# Patient Record
Sex: FEMALE | Race: WHITE | Employment: FULL TIME
[De-identification: names, ages, dates, MRNs, and addresses within clinical notes are randomized per-mention and may not be internally consistent; named-entity substitution may affect disease eponyms.]

---

## 2017-03-07 RX ORDER — TRANEXAMIC ACID 650 1/1
650 TABLET ORAL 3 TIMES DAILY
COMMUNITY
End: 2017-10-25 | Stop reason: ALTCHOICE

## 2017-03-07 RX ORDER — CALCIUM CARBONATE/VITAMIN D3 600 MG-10
1 TABLET ORAL DAILY
COMMUNITY

## 2017-03-07 RX ORDER — MULTIVIT-MIN/IRON FUM/FOLIC AC 7.5 MG-4
1 TABLET ORAL DAILY
COMMUNITY

## 2017-03-09 ENCOUNTER — SURGERY (OUTPATIENT)
Age: 52
End: 2017-03-09

## 2017-03-09 ENCOUNTER — HOSPITAL ENCOUNTER (OUTPATIENT)
Facility: HOSPITAL | Age: 52
Setting detail: HOSPITAL OUTPATIENT SURGERY
Discharge: HOME | End: 2017-03-09
Attending: OBSTETRICS & GYNECOLOGY | Admitting: OBSTETRICS & GYNECOLOGY
Payer: COMMERCIAL

## 2017-03-09 ENCOUNTER — ANESTHESIA (OUTPATIENT)
Dept: SURGERY | Facility: HOSPITAL | Age: 52
End: 2017-03-09
Payer: COMMERCIAL

## 2017-03-09 ENCOUNTER — ANESTHESIA EVENT (OUTPATIENT)
Dept: SURGERY | Facility: HOSPITAL | Age: 52
End: 2017-03-09
Payer: COMMERCIAL

## 2017-03-09 VITALS
TEMPERATURE: 98 F | HEIGHT: 71 IN | DIASTOLIC BLOOD PRESSURE: 71 MMHG | HEART RATE: 78 BPM | BODY MASS INDEX: 30.65 KG/M2 | RESPIRATION RATE: 18 BRPM | SYSTOLIC BLOOD PRESSURE: 123 MMHG | WEIGHT: 218.94 LBS | OXYGEN SATURATION: 98 %

## 2017-03-09 LAB
POCT LOT NUMBER: NORMAL
POCT URINE PREGNANCY: NEGATIVE

## 2017-03-09 PROCEDURE — 81025 URINE PREGNANCY TEST: CPT | Performed by: OBSTETRICS & GYNECOLOGY

## 2017-03-09 PROCEDURE — 88305 TISSUE EXAM BY PATHOLOGIST: CPT | Performed by: OBSTETRICS & GYNECOLOGY

## 2017-03-09 PROCEDURE — 0UBC8ZZ EXCISION OF CERVIX, VIA NATURAL OR ARTIFICIAL OPENING ENDOSCOPIC: ICD-10-PCS | Performed by: OBSTETRICS & GYNECOLOGY

## 2017-03-09 PROCEDURE — 0UDB8ZZ EXTRACTION OF ENDOMETRIUM, VIA NATURAL OR ARTIFICIAL OPENING ENDOSCOPIC: ICD-10-PCS | Performed by: OBSTETRICS & GYNECOLOGY

## 2017-03-09 RX ORDER — MEPERIDINE HYDROCHLORIDE 25 MG/ML
12.5 INJECTION INTRAMUSCULAR; INTRAVENOUS; SUBCUTANEOUS AS NEEDED
Status: DISCONTINUED | OUTPATIENT
Start: 2017-03-09 | End: 2017-03-09

## 2017-03-09 RX ORDER — SODIUM CHLORIDE, SODIUM LACTATE, POTASSIUM CHLORIDE, CALCIUM CHLORIDE 600; 310; 30; 20 MG/100ML; MG/100ML; MG/100ML; MG/100ML
INJECTION, SOLUTION INTRAVENOUS CONTINUOUS
Status: DISCONTINUED | OUTPATIENT
Start: 2017-03-09 | End: 2017-03-09

## 2017-03-09 RX ORDER — MIDAZOLAM HYDROCHLORIDE 1 MG/ML
1 INJECTION INTRAMUSCULAR; INTRAVENOUS EVERY 5 MIN PRN
Status: DISCONTINUED | OUTPATIENT
Start: 2017-03-09 | End: 2017-03-09

## 2017-03-09 RX ORDER — HYDROCODONE BITARTRATE AND ACETAMINOPHEN 5; 325 MG/1; MG/1
2 TABLET ORAL AS NEEDED
Status: DISCONTINUED | OUTPATIENT
Start: 2017-03-09 | End: 2017-03-09

## 2017-03-09 RX ORDER — HYDROCODONE BITARTRATE AND ACETAMINOPHEN 5; 325 MG/1; MG/1
1 TABLET ORAL AS NEEDED
Status: DISCONTINUED | OUTPATIENT
Start: 2017-03-09 | End: 2017-03-09

## 2017-03-09 RX ORDER — ONDANSETRON 2 MG/ML
4 INJECTION INTRAMUSCULAR; INTRAVENOUS AS NEEDED
Status: DISCONTINUED | OUTPATIENT
Start: 2017-03-09 | End: 2017-03-09

## 2017-03-09 RX ORDER — METOCLOPRAMIDE HYDROCHLORIDE 5 MG/ML
10 INJECTION INTRAMUSCULAR; INTRAVENOUS AS NEEDED
Status: DISCONTINUED | OUTPATIENT
Start: 2017-03-09 | End: 2017-03-09

## 2017-03-09 RX ORDER — HYDROMORPHONE HYDROCHLORIDE 1 MG/ML
0.4 INJECTION, SOLUTION INTRAMUSCULAR; INTRAVENOUS; SUBCUTANEOUS EVERY 5 MIN PRN
Status: DISCONTINUED | OUTPATIENT
Start: 2017-03-09 | End: 2017-03-09

## 2017-03-09 RX ORDER — ACETAMINOPHEN 500 MG
1000 TABLET ORAL ONCE AS NEEDED
Status: DISCONTINUED | OUTPATIENT
Start: 2017-03-09 | End: 2017-03-09

## 2017-03-09 RX ORDER — DIPHENHYDRAMINE HYDROCHLORIDE 50 MG/ML
12.5 INJECTION INTRAMUSCULAR; INTRAVENOUS AS NEEDED
Status: DISCONTINUED | OUTPATIENT
Start: 2017-03-09 | End: 2017-03-09

## 2017-03-09 RX ORDER — NALOXONE HYDROCHLORIDE 0.4 MG/ML
80 INJECTION, SOLUTION INTRAMUSCULAR; INTRAVENOUS; SUBCUTANEOUS AS NEEDED
Status: DISCONTINUED | OUTPATIENT
Start: 2017-03-09 | End: 2017-03-09

## 2017-03-09 NOTE — BRIEF OP NOTE
[facility] SURGERY  Brief Op Note     Precious Barnhart Location: OR   CSN [id number] MRN [id number]   Admission Date 3/9/2017 Operation Date 3/9/2017   Attending Physician Aggie Ho,* Operating Physician Fatemeh Campbell MD       Pre-Ope

## 2017-03-09 NOTE — ANESTHESIA PREPROCEDURE EVALUATION
PRE-OP EVALUATION    Patient Name: Ean Ureña    Pre-op Diagnosis: ABNORMAL UTERINE BLEEDING      Procedure(s): HYSTEROSCOPY, DILATION  AND CURETTAGE      Surgeon(s) and Role:     * Goyo Rees MD - Primary    Pre-op vitals reviewed.   Temp: Cardiovascular    Cardiovascular exam normal.         Dental    No notable dental history. Pulmonary    Pulmonary exam normal.                 Other findings            ASA: 1   Plan: general  NPO status verified and patient meets guidelines.

## 2017-03-09 NOTE — H&P
[facility]    History and Physical    Emilia Owens Patient Status:  Hospital Outpatient Surgery    1965 MRN [id number]   [facility] PRE OP HOLDING Attending Ava Jason Day # 0 PCP Fernando Hernandez MD DiphenhydrAMINE HCl (BENADRYL ALLERGY) 25 MG Oral Cap Take 25 mg by mouth every 6 (six) hours as needed. ADVIL 200 MG OR CAPS AS NEEDED       Review of Systems:     Constitutional: Negative for fever and chills.        Physical Exam:   Vital Signs:  Blo

## 2017-03-09 NOTE — ANESTHESIA POSTPROCEDURE EVALUATION
[address]  Patient Status:  Hospital Outpatient Surgery   Age/Gender 46year old female MRN [id number]   [facility] SURGERY Attending Mariola Days Day # 0 PCP Niall Jean MD       Anesthesia Po

## 2017-03-09 NOTE — OPERATIVE REPORT
Pre-Operative Diagnosis: ABNORMAL UTERINE BLEEDING, HEAVY MENSES     Post-Operative Diagnosis: ABNORMAL UTERINE BLEEDING , HEAVY MENSES     Procedure Performed:   Procedure(s):   HYSTEROSCOPY, DILATION  AND CURETTAGE, CERVICAL POLYPECTOMY     Surgeon(s) and

## 2017-10-26 ENCOUNTER — LAB ENCOUNTER (OUTPATIENT)
Dept: LAB | Facility: HOSPITAL | Age: 52
End: 2017-10-26
Attending: FAMILY MEDICINE
Payer: COMMERCIAL

## 2017-10-26 DIAGNOSIS — Z00.00 ROUTINE GENERAL MEDICAL EXAMINATION AT A HEALTH CARE FACILITY: ICD-10-CM

## 2017-10-26 PROCEDURE — 85025 COMPLETE CBC W/AUTO DIFF WBC: CPT

## 2017-10-26 PROCEDURE — 80053 COMPREHEN METABOLIC PANEL: CPT

## 2017-10-26 PROCEDURE — 80061 LIPID PANEL: CPT

## 2017-10-26 PROCEDURE — 36415 COLL VENOUS BLD VENIPUNCTURE: CPT

## 2017-10-31 ENCOUNTER — HOSPITAL ENCOUNTER (OUTPATIENT)
Dept: ULTRASOUND IMAGING | Facility: HOSPITAL | Age: 52
Discharge: HOME | End: 2017-10-31
Attending: OBSTETRICS & GYNECOLOGY
Payer: COMMERCIAL

## 2017-10-31 ENCOUNTER — HOSPITAL ENCOUNTER (OUTPATIENT)
Dept: MAMMOGRAPHY | Facility: HOSPITAL | Age: 52
Discharge: HOME | End: 2017-10-31
Attending: OBSTETRICS & GYNECOLOGY
Payer: COMMERCIAL

## 2017-10-31 DIAGNOSIS — R92.2 DENSE BREASTS: ICD-10-CM

## 2017-10-31 DIAGNOSIS — Z00.00 ROUTINE PHYSICAL EXAMINATION: ICD-10-CM

## 2017-10-31 PROCEDURE — 77063 BREAST TOMOSYNTHESIS BI: CPT | Performed by: OBSTETRICS & GYNECOLOGY

## 2017-10-31 PROCEDURE — 76641 ULTRASOUND BREAST COMPLETE: CPT | Performed by: OBSTETRICS & GYNECOLOGY

## 2017-10-31 PROCEDURE — 77067 SCR MAMMO BI INCL CAD: CPT | Performed by: OBSTETRICS & GYNECOLOGY

## 2017-11-10 ENCOUNTER — HOSPITAL ENCOUNTER (OUTPATIENT)
Dept: ULTRASOUND IMAGING | Facility: HOSPITAL | Age: 52
Discharge: HOME | End: 2017-11-10
Attending: OBSTETRICS & GYNECOLOGY
Payer: COMMERCIAL

## 2017-11-10 DIAGNOSIS — R92.8 ABNORMAL MAMMOGRAM: ICD-10-CM

## 2017-11-10 PROCEDURE — 76642 ULTRASOUND BREAST LIMITED: CPT | Performed by: OBSTETRICS & GYNECOLOGY

## 2017-11-20 PROBLEM — G47.30 SLEEP-DISORDERED BREATHING: Status: ACTIVE | Noted: 2017-11-20

## 2017-11-20 PROBLEM — R06.83 SNORING: Status: ACTIVE | Noted: 2017-11-20

## 2017-11-22 ENCOUNTER — LAB ENCOUNTER (OUTPATIENT)
Dept: LAB | Facility: HOSPITAL | Age: 52
End: 2017-11-22
Attending: OTOLARYNGOLOGY
Payer: COMMERCIAL

## 2017-11-22 DIAGNOSIS — E04.1 THYROID NODULE: ICD-10-CM

## 2017-11-22 PROCEDURE — 36415 COLL VENOUS BLD VENIPUNCTURE: CPT

## 2017-11-22 PROCEDURE — 84439 ASSAY OF FREE THYROXINE: CPT

## 2017-11-22 PROCEDURE — 84443 ASSAY THYROID STIM HORMONE: CPT

## 2017-11-27 NOTE — PROGRESS NOTES
Please call and tell her that her thyroid function is just borderline low. Can start a low dose synthroid or follow up with PCP to discuss.

## 2017-11-29 NOTE — PROGRESS NOTES
Received voice mail from pt returning my phone call, called pt back was unable to reach pt left message request pt call back

## 2017-11-30 NOTE — PROGRESS NOTES
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 Attempted to contact pt; left detailed message with info per RB: thyroid function is just borderline low.

## 2018-11-20 ENCOUNTER — HOSPITAL ENCOUNTER (OUTPATIENT)
Dept: MAMMOGRAPHY | Facility: HOSPITAL | Age: 53
Discharge: HOME | End: 2018-11-20
Attending: FAMILY MEDICINE
Payer: COMMERCIAL

## 2018-11-20 DIAGNOSIS — Z12.39 SCREENING BREAST EXAMINATION: ICD-10-CM

## 2018-11-20 DIAGNOSIS — Z12.31 VISIT FOR SCREENING MAMMOGRAM: ICD-10-CM

## 2018-11-20 PROCEDURE — 77067 SCR MAMMO BI INCL CAD: CPT | Performed by: FAMILY MEDICINE

## 2018-11-20 PROCEDURE — 77063 BREAST TOMOSYNTHESIS BI: CPT | Performed by: FAMILY MEDICINE

## 2018-12-06 ENCOUNTER — HOSPITAL ENCOUNTER (OUTPATIENT)
Dept: ULTRASOUND IMAGING | Facility: HOSPITAL | Age: 53
Discharge: HOME | End: 2018-12-06
Attending: OBSTETRICS & GYNECOLOGY
Payer: COMMERCIAL

## 2018-12-06 DIAGNOSIS — R92.2 DENSE BREAST TISSUE: ICD-10-CM

## 2018-12-06 PROCEDURE — 76641 ULTRASOUND BREAST COMPLETE: CPT | Performed by: OBSTETRICS & GYNECOLOGY

## 2019-01-04 ENCOUNTER — HOSPITAL ENCOUNTER (OUTPATIENT)
Dept: ULTRASOUND IMAGING | Facility: HOSPITAL | Age: 54
Discharge: HOME | End: 2019-01-04
Attending: OBSTETRICS & GYNECOLOGY
Payer: COMMERCIAL

## 2019-01-04 ENCOUNTER — APPOINTMENT (OUTPATIENT)
Dept: LAB | Facility: HOSPITAL | Age: 54
End: 2019-01-04
Attending: FAMILY MEDICINE
Payer: COMMERCIAL

## 2019-01-04 DIAGNOSIS — R92.8 ABNORMAL MAMMOGRAM: ICD-10-CM

## 2019-01-04 DIAGNOSIS — E03.9 HYPOTHYROIDISM, UNSPECIFIED TYPE: ICD-10-CM

## 2019-01-04 LAB — TSI SER-ACNC: 2.83 MIU/ML (ref 0.35–5.5)

## 2019-01-04 PROCEDURE — 36415 COLL VENOUS BLD VENIPUNCTURE: CPT

## 2019-01-04 PROCEDURE — 76642 ULTRASOUND BREAST LIMITED: CPT | Performed by: OBSTETRICS & GYNECOLOGY

## 2019-01-04 PROCEDURE — 84443 ASSAY THYROID STIM HORMONE: CPT

## 2019-02-04 PROBLEM — M75.02 ADHESIVE CAPSULITIS OF LEFT SHOULDER: Status: ACTIVE | Noted: 2019-02-04

## 2019-02-04 PROBLEM — S43.422A SPRAIN OF LEFT ROTATOR CUFF CAPSULE, INITIAL ENCOUNTER: Status: ACTIVE | Noted: 2019-02-04

## 2019-05-06 PROBLEM — G89.29 CHRONIC LEFT SHOULDER PAIN: Status: ACTIVE | Noted: 2019-05-06

## 2019-05-06 PROBLEM — M25.512 CHRONIC LEFT SHOULDER PAIN: Status: ACTIVE | Noted: 2019-05-06

## 2019-07-26 ENCOUNTER — HOSPITAL ENCOUNTER (OUTPATIENT)
Dept: ULTRASOUND IMAGING | Facility: HOSPITAL | Age: 54
Discharge: HOME | End: 2019-07-26
Attending: OBSTETRICS & GYNECOLOGY
Payer: COMMERCIAL

## 2019-07-26 DIAGNOSIS — R92.8 ABNORMAL ULTRASOUND OF BREAST: ICD-10-CM

## 2019-07-26 PROCEDURE — 76642 ULTRASOUND BREAST LIMITED: CPT | Performed by: OBSTETRICS & GYNECOLOGY

## 2020-01-24 ENCOUNTER — HOSPITAL ENCOUNTER (OUTPATIENT)
Dept: MAMMOGRAPHY | Facility: HOSPITAL | Age: 55
Discharge: HOME | End: 2020-01-24
Attending: OBSTETRICS & GYNECOLOGY
Payer: COMMERCIAL

## 2020-01-24 DIAGNOSIS — Z12.31 VISIT FOR SCREENING MAMMOGRAM: ICD-10-CM

## 2020-01-24 PROCEDURE — 77063 BREAST TOMOSYNTHESIS BI: CPT | Performed by: OBSTETRICS & GYNECOLOGY

## 2020-01-24 PROCEDURE — 77067 SCR MAMMO BI INCL CAD: CPT | Performed by: OBSTETRICS & GYNECOLOGY

## 2020-01-29 ENCOUNTER — HOSPITAL ENCOUNTER (OUTPATIENT)
Dept: MAMMOGRAPHY | Facility: HOSPITAL | Age: 55
Discharge: HOME | End: 2020-01-29
Attending: OBSTETRICS & GYNECOLOGY
Payer: COMMERCIAL

## 2020-01-29 DIAGNOSIS — R92.2 INCONCLUSIVE MAMMOGRAM: ICD-10-CM

## 2020-01-29 PROCEDURE — 77061 BREAST TOMOSYNTHESIS UNI: CPT | Performed by: OBSTETRICS & GYNECOLOGY

## 2020-01-29 PROCEDURE — 77065 DX MAMMO INCL CAD UNI: CPT | Performed by: OBSTETRICS & GYNECOLOGY

## 2020-01-29 PROCEDURE — 76642 ULTRASOUND BREAST LIMITED: CPT | Performed by: OBSTETRICS & GYNECOLOGY

## 2020-02-10 PROBLEM — H93.299 ABNORMAL AUDITORY PERCEPTION, UNSPECIFIED LATERALITY: Status: ACTIVE | Noted: 2020-02-10

## 2020-02-10 PROBLEM — H81.11 BPPV (BENIGN PAROXYSMAL POSITIONAL VERTIGO), RIGHT: Status: ACTIVE | Noted: 2020-02-10

## 2020-04-15 ENCOUNTER — TELEPHONE (OUTPATIENT)
Dept: INTERNAL MEDICINE CLINIC | Facility: HOSPITAL | Age: 55
End: 2020-04-15

## 2020-04-15 DIAGNOSIS — Z20.822 SUSPECTED COVID-19 VIRUS INFECTION: Primary | ICD-10-CM

## 2020-04-16 ENCOUNTER — LAB ENCOUNTER (OUTPATIENT)
Dept: LAB | Facility: HOSPITAL | Age: 55
End: 2020-04-16
Attending: PREVENTIVE MEDICINE
Payer: COMMERCIAL

## 2020-04-16 DIAGNOSIS — Z20.822 SUSPECTED COVID-19 VIRUS INFECTION: ICD-10-CM

## 2020-04-17 NOTE — IMMEDIATE CARE/WORKERS COMP PHYSICIAN REPORT
Called employee (verified by 2 identifiers) to give NEGATIVE results of COVID test that were reviewed by Dr. Jax Nickerson.     Per [facility] negative COVID guidelines:    · Employee instructed to continue to self-monitor symptoms and RTW when fever free for 24

## 2020-04-17 NOTE — PROGRESS NOTES
Rapid and confirmation COVID were negative. COVID hotline emailed to get in touch with patient and give further instructions.

## 2020-06-01 ENCOUNTER — LAB ENCOUNTER (OUTPATIENT)
Dept: LAB | Facility: HOSPITAL | Age: 55
End: 2020-06-01
Attending: INTERNAL MEDICINE
Payer: COMMERCIAL

## 2020-06-01 DIAGNOSIS — Z01.812 PRE-PROCEDURE LAB EXAM: ICD-10-CM

## 2020-06-04 PROBLEM — Z86.010 HISTORY OF ADENOMATOUS POLYP OF COLON: Status: ACTIVE | Noted: 2020-06-04

## 2020-06-04 PROBLEM — Z86.0101 HISTORY OF ADENOMATOUS POLYP OF COLON: Status: ACTIVE | Noted: 2020-06-04

## 2020-07-21 DIAGNOSIS — Z78.9 PARTICIPANT IN HEALTH AND WELLNESS PLAN: Primary | ICD-10-CM

## 2020-07-22 ENCOUNTER — NURSE ONLY (OUTPATIENT)
Dept: LAB | Age: 55
End: 2020-07-22
Attending: PREVENTIVE MEDICINE

## 2020-07-22 DIAGNOSIS — Z78.9 PARTICIPANT IN HEALTH AND WELLNESS PLAN: ICD-10-CM

## 2020-07-22 LAB — SARS-COV-2 IGG SERPLBLD QL IA.RAPID: NEGATIVE

## 2020-07-22 PROCEDURE — 86769 SARS-COV-2 COVID-19 ANTIBODY: CPT

## 2020-09-28 ENCOUNTER — TELEPHONE (OUTPATIENT)
Dept: INTERNAL MEDICINE CLINIC | Facility: CLINIC | Age: 55
End: 2020-09-28

## 2020-09-28 ENCOUNTER — OFFICE VISIT (OUTPATIENT)
Dept: INTERNAL MEDICINE CLINIC | Facility: CLINIC | Age: 55
End: 2020-09-28
Payer: COMMERCIAL

## 2020-09-28 VITALS
HEART RATE: 92 BPM | TEMPERATURE: 97 F | RESPIRATION RATE: 16 BRPM | WEIGHT: 189 LBS | HEIGHT: 71 IN | BODY MASS INDEX: 26.46 KG/M2 | DIASTOLIC BLOOD PRESSURE: 74 MMHG | SYSTOLIC BLOOD PRESSURE: 114 MMHG

## 2020-09-28 DIAGNOSIS — M25.522 LEFT ELBOW PAIN: ICD-10-CM

## 2020-09-28 DIAGNOSIS — M25.512 CHRONIC PAIN OF BOTH SHOULDERS: Primary | ICD-10-CM

## 2020-09-28 DIAGNOSIS — Z13.220 SCREENING FOR LIPID DISORDERS: ICD-10-CM

## 2020-09-28 DIAGNOSIS — E03.9 ACQUIRED HYPOTHYROIDISM: ICD-10-CM

## 2020-09-28 DIAGNOSIS — M25.511 CHRONIC PAIN OF BOTH SHOULDERS: Primary | ICD-10-CM

## 2020-09-28 DIAGNOSIS — Z13.0 SCREENING FOR DISORDER OF BLOOD AND BLOOD-FORMING ORGANS: ICD-10-CM

## 2020-09-28 DIAGNOSIS — Z13.228 SCREENING FOR METABOLIC DISORDER: ICD-10-CM

## 2020-09-28 DIAGNOSIS — Z00.00 ROUTINE GENERAL MEDICAL EXAMINATION AT A HEALTH CARE FACILITY: ICD-10-CM

## 2020-09-28 DIAGNOSIS — E03.9 ACQUIRED HYPOTHYROIDISM: Primary | ICD-10-CM

## 2020-09-28 DIAGNOSIS — H81.11 BPPV (BENIGN PAROXYSMAL POSITIONAL VERTIGO), RIGHT: ICD-10-CM

## 2020-09-28 DIAGNOSIS — G89.29 CHRONIC PAIN OF BOTH SHOULDERS: Primary | ICD-10-CM

## 2020-09-28 PROCEDURE — 3074F SYST BP LT 130 MM HG: CPT | Performed by: FAMILY MEDICINE

## 2020-09-28 PROCEDURE — 3078F DIAST BP <80 MM HG: CPT | Performed by: FAMILY MEDICINE

## 2020-09-28 PROCEDURE — 99203 OFFICE O/P NEW LOW 30 MIN: CPT | Performed by: FAMILY MEDICINE

## 2020-09-28 PROCEDURE — 3008F BODY MASS INDEX DOCD: CPT | Performed by: FAMILY MEDICINE

## 2020-09-28 NOTE — TELEPHONE ENCOUNTER
Annual Physical   Future Appointments   Date Time Provider Kami Karen   11/30/2020  9:40 AM Taco Flores MD [facility]       Lab is Paola Al Pt aware to fast and to complete labs no sooner than 2 weeks prior to physical   No call back required

## 2020-09-28 NOTE — PROGRESS NOTES
Sophie Jo  11/13/1965    Patient presents with:  Wellness Visit: DD rm 9, Establish Care, Wellness Visit  Shoulder Pain: H/O L Shoulder Tear, Elbow Pain  Health Maintenance: February       HPI:   Sophie Jo is a 47year old female who presents with to e times daily as needed for Dizziness. 60 tablet 1   • FLUTICASONE PROPIONATE 50 MCG/ACT Nasal Suspension SPRAY 2 SPRAYS INTO EACH NOSTRIL EVERY DAY 3 Bottle 0   • Psyllium (METAMUCIL FIBER OR) Take by mouth 2 (two) times daily.      • Multiple Vitamins-[place] CURETTAGE N/A 3/9/2017    Performed by Connie Hernandez MD at [facility] MAIN OR      Family History   Problem Relation Age of Onset   • Heart Disorder Father 67        MI x 2, first at 67   • Lipids Father    • Hypertension Father    • Heart Attack Father care.    1. Acquired hypothyroidism  - Last TSH normal, continue current medication dose    2. BPPV (benign paroxysmal positional vertigo), right  - Controlled with PRN meclizine and home vestibular therapy, continue current treatment.      3. Left elbow pa

## 2020-10-01 ENCOUNTER — HOSPITAL ENCOUNTER (OUTPATIENT)
Dept: GENERAL RADIOLOGY | Facility: HOSPITAL | Age: 55
Discharge: HOME | End: 2020-10-01
Attending: FAMILY MEDICINE
Payer: COMMERCIAL

## 2020-10-01 ENCOUNTER — LAB ENCOUNTER (OUTPATIENT)
Dept: LAB | Facility: HOSPITAL | Age: 55
End: 2020-10-01
Attending: FAMILY MEDICINE
Payer: COMMERCIAL

## 2020-10-01 DIAGNOSIS — Z13.0 SCREENING FOR DISORDER OF BLOOD AND BLOOD-FORMING ORGANS: ICD-10-CM

## 2020-10-01 DIAGNOSIS — M25.522 LEFT ELBOW PAIN: ICD-10-CM

## 2020-10-01 DIAGNOSIS — Z13.220 SCREENING FOR LIPID DISORDERS: ICD-10-CM

## 2020-10-01 DIAGNOSIS — Z13.228 SCREENING FOR METABOLIC DISORDER: ICD-10-CM

## 2020-10-01 DIAGNOSIS — Z00.00 ROUTINE GENERAL MEDICAL EXAMINATION AT A HEALTH CARE FACILITY: ICD-10-CM

## 2020-10-01 DIAGNOSIS — E03.9 ACQUIRED HYPOTHYROIDISM: ICD-10-CM

## 2020-10-01 PROCEDURE — 80061 LIPID PANEL: CPT

## 2020-10-01 PROCEDURE — 80053 COMPREHEN METABOLIC PANEL: CPT

## 2020-10-01 PROCEDURE — 73080 X-RAY EXAM OF ELBOW: CPT | Performed by: FAMILY MEDICINE

## 2020-10-01 PROCEDURE — 85025 COMPLETE CBC W/AUTO DIFF WBC: CPT

## 2020-10-01 PROCEDURE — 84443 ASSAY THYROID STIM HORMONE: CPT

## 2020-10-01 PROCEDURE — 36415 COLL VENOUS BLD VENIPUNCTURE: CPT

## 2020-10-23 ENCOUNTER — OFFICE VISIT (OUTPATIENT)
Dept: ORTHOPEDICS CLINIC | Facility: CLINIC | Age: 55
End: 2020-10-23
Payer: COMMERCIAL

## 2020-10-23 VITALS — HEART RATE: 88 BPM | OXYGEN SATURATION: 96 %

## 2020-10-23 DIAGNOSIS — M25.529 ELBOW PAIN, CHRONIC, UNSPECIFIED LATERALITY: Primary | ICD-10-CM

## 2020-10-23 DIAGNOSIS — G89.29 ELBOW PAIN, CHRONIC, UNSPECIFIED LATERALITY: Primary | ICD-10-CM

## 2020-10-23 PROCEDURE — 99213 OFFICE O/P EST LOW 20 MIN: CPT | Performed by: FAMILY MEDICINE

## 2020-10-23 RX ORDER — NAPROXEN 250 MG/1
250 TABLET ORAL 2 TIMES DAILY WITH MEALS
COMMUNITY
End: 2021-01-05 | Stop reason: ALTCHOICE

## 2020-10-23 NOTE — PROGRESS NOTES
[facility] Progress Note    Subjective:  Patient is present for follow-up on her bilateral lateral elbow pain. Since her last visit she has been modifying activities and using oral naproxen for pain. She did complete her x-ray.   She notes that her based on her imaging thus far and exam.  She has no significant weakness or paresthesias to suspect pain entrapment of radial tunnel syndrome. Her tenderness is outside of her extensor mass making peer lateral epicondylitis unlikely.   Her left elbow x-ray

## 2020-11-20 ENCOUNTER — OFFICE VISIT (OUTPATIENT)
Dept: ORTHOPEDICS CLINIC | Facility: CLINIC | Age: 55
End: 2020-11-20
Payer: COMMERCIAL

## 2020-11-20 VITALS — OXYGEN SATURATION: 98 % | HEART RATE: 79 BPM

## 2020-11-20 DIAGNOSIS — M25.529 ELBOW PAIN, CHRONIC, UNSPECIFIED LATERALITY: Primary | ICD-10-CM

## 2020-11-20 DIAGNOSIS — G89.29 ELBOW PAIN, CHRONIC, UNSPECIFIED LATERALITY: Primary | ICD-10-CM

## 2020-11-20 DIAGNOSIS — M77.12 LATERAL EPICONDYLITIS OF BOTH ELBOWS: ICD-10-CM

## 2020-11-20 DIAGNOSIS — M77.11 LATERAL EPICONDYLITIS OF BOTH ELBOWS: ICD-10-CM

## 2020-11-20 PROCEDURE — 99072 ADDL SUPL MATRL&STAF TM PHE: CPT | Performed by: FAMILY MEDICINE

## 2020-11-20 PROCEDURE — 99213 OFFICE O/P EST LOW 20 MIN: CPT | Performed by: FAMILY MEDICINE

## 2020-11-20 NOTE — PROGRESS NOTES
[facility] Progress Note    Subjective:  Patient is present for follow-up on her bilateral lateral elbow pain. Since her last visit she has been modifying activities and using oral naproxen for pain.   She has noted continued but slow improvement in elbows      Bhavik Huizar MD  [facility]

## 2020-11-30 ENCOUNTER — OFFICE VISIT (OUTPATIENT)
Dept: INTERNAL MEDICINE CLINIC | Facility: CLINIC | Age: 55
End: 2020-11-30
Payer: COMMERCIAL

## 2020-11-30 VITALS
DIASTOLIC BLOOD PRESSURE: 74 MMHG | HEART RATE: 80 BPM | HEIGHT: 71 IN | WEIGHT: 189.19 LBS | TEMPERATURE: 97 F | RESPIRATION RATE: 18 BRPM | SYSTOLIC BLOOD PRESSURE: 102 MMHG | BODY MASS INDEX: 26.49 KG/M2

## 2020-11-30 DIAGNOSIS — E03.9 ACQUIRED HYPOTHYROIDISM: ICD-10-CM

## 2020-11-30 DIAGNOSIS — Z12.4 SCREENING FOR CERVICAL CANCER: ICD-10-CM

## 2020-11-30 DIAGNOSIS — Z12.31 ENCOUNTER FOR SCREENING MAMMOGRAM FOR MALIGNANT NEOPLASM OF BREAST: ICD-10-CM

## 2020-11-30 DIAGNOSIS — Z00.00 WELLNESS EXAMINATION: Primary | ICD-10-CM

## 2020-11-30 PROCEDURE — 3078F DIAST BP <80 MM HG: CPT | Performed by: FAMILY MEDICINE

## 2020-11-30 PROCEDURE — 99072 ADDL SUPL MATRL&STAF TM PHE: CPT | Performed by: FAMILY MEDICINE

## 2020-11-30 PROCEDURE — 3074F SYST BP LT 130 MM HG: CPT | Performed by: FAMILY MEDICINE

## 2020-11-30 PROCEDURE — 3008F BODY MASS INDEX DOCD: CPT | Performed by: FAMILY MEDICINE

## 2020-11-30 PROCEDURE — 99396 PREV VISIT EST AGE 40-64: CPT | Performed by: FAMILY MEDICINE

## 2020-11-30 RX ORDER — DOCUSATE SODIUM 100 MG/1
100 CAPSULE, LIQUID FILLED ORAL 2 TIMES DAILY
COMMUNITY

## 2020-11-30 NOTE — PROGRESS NOTES
Rodolfo Montelongo  11/13/1965    Patient presents with:  Wellness Visit: MR veronica 8 annual pe       HPI:   Rodolfo Montelongo is a 54year old female who presents with for her annual physical. She did complete her labs. She has been consistent with Levothyroxine.  She had meningtitis   • Irregular bowel habits 1990   • Menses painful 1980   • Migraines    • Nausea 02/2020    At work.  I blame stress from COVID   • OTHER DISEASES     + gyne - utd    • Pain in joints 1980   • Rash    • Sleep disturbance 2/2020    Usually the n vision  HEENT: congested  LUNGS: denies shortness of breath with exertion  CARDIOVASCULAR: denies chest pain on exertion  GI: no nausea or abdominal pain  NEURO: denies headaches    EXAM:   /74 (BP Location: Left arm, Patient Position: Sitting, Cuff

## 2020-12-11 ENCOUNTER — OFFICE VISIT (OUTPATIENT)
Dept: FAMILY MEDICINE CLINIC | Facility: CLINIC | Age: 55
End: 2020-12-11
Payer: COMMERCIAL

## 2020-12-11 ENCOUNTER — TELEPHONE (OUTPATIENT)
Dept: INTERNAL MEDICINE CLINIC | Facility: CLINIC | Age: 55
End: 2020-12-11

## 2020-12-11 VITALS
OXYGEN SATURATION: 99 % | HEART RATE: 80 BPM | RESPIRATION RATE: 20 BRPM | SYSTOLIC BLOOD PRESSURE: 122 MMHG | TEMPERATURE: 98 F | BODY MASS INDEX: 25.9 KG/M2 | WEIGHT: 185 LBS | HEIGHT: 71 IN | DIASTOLIC BLOOD PRESSURE: 72 MMHG

## 2020-12-11 DIAGNOSIS — R39.9 UTI SYMPTOMS: ICD-10-CM

## 2020-12-11 DIAGNOSIS — R35.0 FREQUENCY OF URINATION: Primary | ICD-10-CM

## 2020-12-11 PROCEDURE — 3078F DIAST BP <80 MM HG: CPT | Performed by: NURSE PRACTITIONER

## 2020-12-11 PROCEDURE — 81003 URINALYSIS AUTO W/O SCOPE: CPT | Performed by: NURSE PRACTITIONER

## 2020-12-11 PROCEDURE — 99213 OFFICE O/P EST LOW 20 MIN: CPT | Performed by: NURSE PRACTITIONER

## 2020-12-11 PROCEDURE — 87086 URINE CULTURE/COLONY COUNT: CPT | Performed by: NURSE PRACTITIONER

## 2020-12-11 PROCEDURE — 3074F SYST BP LT 130 MM HG: CPT | Performed by: NURSE PRACTITIONER

## 2020-12-11 PROCEDURE — 3008F BODY MASS INDEX DOCD: CPT | Performed by: NURSE PRACTITIONER

## 2020-12-11 RX ORDER — NITROFURANTOIN 25; 75 MG/1; MG/1
100 CAPSULE ORAL 2 TIMES DAILY
Qty: 14 CAPSULE | Refills: 0 | Status: SHIPPED | OUTPATIENT
Start: 2020-12-11 | End: 2020-12-18

## 2020-12-11 NOTE — PROGRESS NOTES
CHIEF COMPLAINT:   Patient presents with:  UTI: urgency to urinate pain incontinence, left lower abdominal, odor today      HPI:   Rehana Lowe is a 54year old female who presents with symptoms of UTI.  Complaining of urinary pain, urgency, some incontine Diagnosis Date   • Constipation    • Detached retina, right    • Exposure to TB 2014    At work   • Frequent use of laxatives 2019    To become more regular   • Heartburn 2018   • Heavy menses 2014   • Hemorrhoids    • HOSPITALIZATIONS     viral meningtiti Result Value Ref Range    Glucose Urine negative mg/dL    Bilirubin negative Negative    Ketones, UA negative Negative mg/dL    Spec Gravity 1.020 1.005 - 1.030    Blood Urine moderate Negative    PH Urine 7.0 4.5 - 8.0    Protein Urine negative Negative/T Urine normally doesn't have any germs (bacteria) in it. But bacteria can get into the urinary tract from the skin around the rectum. Or they can travel in the blood from other parts of the body.  Once they are in your urinary tract, they can cause infection · Bowel incontinence  · Pregnancy  · Procedures such as having a catheter put in  · Older age  · Not emptying your bladder. This can give bacteria a chance to grow in your urine.   · Fluid loss (dehydration)  · Constipation  · Having sex  · Using a diaphrag · Don't have sex until your symptoms are gone. · Don't have caffeine, alcohol, and spicy foods. These can irritate your bladder. · Urinate right after you have sex to flush out your bladder.   · If you use birth control pills and have frequent bladder inf

## 2020-12-18 ENCOUNTER — IMMUNIZATION (OUTPATIENT)
Dept: LAB | Facility: HOSPITAL | Age: 55
End: 2020-12-18
Attending: PREVENTIVE MEDICINE
Payer: COMMERCIAL

## 2020-12-18 DIAGNOSIS — Z23 NEED FOR VACCINATION: ICD-10-CM

## 2020-12-18 PROCEDURE — 0001A PFIZER-BIONTECH COVID-19 VACCINE: CPT

## 2021-01-05 ENCOUNTER — OFFICE VISIT (OUTPATIENT)
Dept: ORTHOPEDICS CLINIC | Facility: CLINIC | Age: 56
End: 2021-01-05
Payer: COMMERCIAL

## 2021-01-05 VITALS — HEART RATE: 81 BPM | OXYGEN SATURATION: 96 %

## 2021-01-05 DIAGNOSIS — M77.11 LATERAL EPICONDYLITIS OF BOTH ELBOWS: Primary | ICD-10-CM

## 2021-01-05 DIAGNOSIS — M77.12 LATERAL EPICONDYLITIS OF BOTH ELBOWS: Primary | ICD-10-CM

## 2021-01-05 PROCEDURE — 99214 OFFICE O/P EST MOD 30 MIN: CPT | Performed by: FAMILY MEDICINE

## 2021-01-05 RX ORDER — MELOXICAM 15 MG/1
15 TABLET ORAL DAILY
Qty: 30 TABLET | Refills: 0 | Status: SHIPPED | OUTPATIENT
Start: 2021-01-05 | End: 2021-02-05

## 2021-01-05 NOTE — PROGRESS NOTES
[facility] Progress Note    Subjective:  Patient is present for follow-up on her bilateral lateral elbow pain.   Since her last visit she has been continuing her HEP and has noted nearly resolved pain in her left elbow and has has had continued improv mouth daily. Dispense: 30 tablet;  Refill: 0      Cassandra Moses MD  [address] [place] [address]

## 2021-01-08 ENCOUNTER — IMMUNIZATION (OUTPATIENT)
Dept: LAB | Facility: HOSPITAL | Age: 56
End: 2021-01-08
Attending: PREVENTIVE MEDICINE

## 2021-01-08 DIAGNOSIS — Z23 NEED FOR VACCINATION: ICD-10-CM

## 2021-01-08 PROCEDURE — 0002A SARSCOV2 VAC 30MCG/0.3ML IM: CPT

## 2021-01-28 ENCOUNTER — TELEPHONE (OUTPATIENT)
Dept: INTERNAL MEDICINE CLINIC | Facility: CLINIC | Age: 56
End: 2021-01-28

## 2021-01-28 RX ORDER — LEVOTHYROXINE SODIUM 0.03 MG/1
25 TABLET ORAL
Qty: 90 TABLET | Refills: 2 | Status: SHIPPED | OUTPATIENT
Start: 2021-01-28 | End: 2021-10-08

## 2021-01-28 NOTE — TELEPHONE ENCOUNTER
Prescription Refill Request - Patient advised can take 48-72 hours. Name of Medication (strength, dose, qty requested:   LEVOTHYROXINE SODIUM 25 MCG Oral Tab 90 tablet 2 5/6/2020     Sig - Route: TAKE 1 TABLET (25 MCG TOTAL) BY MOUTH BEFORE BREAKFAST.  -

## 2021-01-28 NOTE — TELEPHONE ENCOUNTER
Last Ov: 11/30/20, [address], CPE  Last labs: CBC, CMP, lipid, TSH w Ref 10/1/20  Last Rx: levothyroxine 25mcg, #90, 2R 5/6/20    Future Appointments   Date Time Provider Kami Jones   2/5/2021  7:45 AM Misbah Berry MD [facility]

## 2021-02-05 ENCOUNTER — OFFICE VISIT (OUTPATIENT)
Dept: ORTHOPEDICS CLINIC | Facility: CLINIC | Age: 56
End: 2021-02-05
Payer: COMMERCIAL

## 2021-02-05 VITALS — HEART RATE: 75 BPM | OXYGEN SATURATION: 100 %

## 2021-02-05 DIAGNOSIS — M77.11 LATERAL EPICONDYLITIS OF RIGHT ELBOW: Primary | ICD-10-CM

## 2021-02-05 PROCEDURE — 99213 OFFICE O/P EST LOW 20 MIN: CPT | Performed by: FAMILY MEDICINE

## 2021-02-05 RX ORDER — MELOXICAM 15 MG/1
15 TABLET ORAL DAILY
Qty: 30 TABLET | Refills: 0 | Status: SHIPPED | OUTPATIENT
Start: 2021-02-05 | End: 2021-03-06

## 2021-02-05 NOTE — PROGRESS NOTES
[facility] Progress Note    Subjective:  Patient is present for follow-up on her bilateral lateral elbow pain. She has continued to have improvement in her R elbow pain. She notes that she had almost one week of complete resolution or pain.  Had some

## 2021-03-05 DIAGNOSIS — M77.11 LATERAL EPICONDYLITIS OF RIGHT ELBOW: ICD-10-CM

## 2021-03-06 RX ORDER — MELOXICAM 15 MG/1
TABLET ORAL
Qty: 30 TABLET | Refills: 0 | Status: SHIPPED | OUTPATIENT
Start: 2021-03-06 | End: 2021-10-16

## 2021-03-06 NOTE — TELEPHONE ENCOUNTER
Clotilde Diaz on file   LAST RX:2/5/21 15 mg take 1 tab daily 30 tabs 0 refills   LAST LABS:  PER PROTOCOL: to provider

## 2021-03-16 ENCOUNTER — PATIENT MESSAGE (OUTPATIENT)
Dept: ORTHOPEDICS CLINIC | Facility: CLINIC | Age: 56
End: 2021-03-16

## 2021-03-16 NOTE — TELEPHONE ENCOUNTER
Lets see if we can get her in clinic next week for re-evaluation and potential injection. Schedule for 30 min procedure visit please. Thank you.

## 2021-03-16 NOTE — TELEPHONE ENCOUNTER
From: Rodolfo Montelongo  To: Myah Arroyo MD  Sent: 3/16/2021 8:12 AM CDT  Subject: Visit Follow-up Question    I attempted to wean the pain medicine by taking it every other day. It didn't work .  My right elbow started really hurting and my left elbow started

## 2021-03-26 ENCOUNTER — OFFICE VISIT (OUTPATIENT)
Dept: ORTHOPEDICS CLINIC | Facility: CLINIC | Age: 56
End: 2021-03-26
Payer: COMMERCIAL

## 2021-03-26 VITALS — HEART RATE: 79 BPM | OXYGEN SATURATION: 99 %

## 2021-03-26 DIAGNOSIS — M77.11 LATERAL EPICONDYLITIS OF RIGHT ELBOW: Primary | ICD-10-CM

## 2021-03-26 PROCEDURE — 99213 OFFICE O/P EST LOW 20 MIN: CPT | Performed by: FAMILY MEDICINE

## 2021-03-26 PROCEDURE — 20551 NJX 1 TENDON ORIGIN/INSJ: CPT | Performed by: FAMILY MEDICINE

## 2021-03-26 RX ORDER — TRIAMCINOLONE ACETONIDE 40 MG/ML
40 INJECTION, SUSPENSION INTRA-ARTICULAR; INTRAMUSCULAR ONCE
Status: COMPLETED | OUTPATIENT
Start: 2021-03-26 | End: 2021-03-26

## 2021-03-26 RX ADMIN — TRIAMCINOLONE ACETONIDE 40 MG: 40 INJECTION, SUSPENSION INTRA-ARTICULAR; INTRAMUSCULAR at 08:28:00

## 2021-03-26 NOTE — PROGRESS NOTES
[facility] Progress Note    Subjective:  Patient is present for follow-up on her right lateral elbow pain. She has been consistent with her HEP and tried to wean off her meloxicam unsuccessfully.  She states that her pain returned and worsened after t epicondyl corticosteroid injection which she tolerated well. She will continue her anti-inflammatories as needed, counterforce bracing when active, and HEP. She will follow-up in 4 weeks.  Based on her symptoms of her left elbow, we may consider trial of le

## 2021-07-20 ENCOUNTER — HOSPITAL ENCOUNTER (OUTPATIENT)
Dept: MAMMOGRAPHY | Facility: HOSPITAL | Age: 56
Discharge: HOME | End: 2021-07-20
Attending: OBSTETRICS & GYNECOLOGY
Payer: COMMERCIAL

## 2021-07-20 DIAGNOSIS — Z12.31 ENCOUNTER FOR SCREENING MAMMOGRAM FOR MALIGNANT NEOPLASM OF BREAST: ICD-10-CM

## 2021-07-20 PROCEDURE — 77063 BREAST TOMOSYNTHESIS BI: CPT | Performed by: OBSTETRICS & GYNECOLOGY

## 2021-07-20 PROCEDURE — 77067 SCR MAMMO BI INCL CAD: CPT | Performed by: OBSTETRICS & GYNECOLOGY

## 2021-07-27 ENCOUNTER — HOSPITAL ENCOUNTER (OUTPATIENT)
Dept: MAMMOGRAPHY | Facility: HOSPITAL | Age: 56
Discharge: HOME | End: 2021-07-27
Attending: OBSTETRICS & GYNECOLOGY
Payer: COMMERCIAL

## 2021-07-27 DIAGNOSIS — R92.2 INCONCLUSIVE MAMMOGRAM: ICD-10-CM

## 2021-07-27 PROCEDURE — 76642 ULTRASOUND BREAST LIMITED: CPT | Performed by: OBSTETRICS & GYNECOLOGY

## 2021-07-27 PROCEDURE — 77062 BREAST TOMOSYNTHESIS BI: CPT | Performed by: OBSTETRICS & GYNECOLOGY

## 2021-07-27 PROCEDURE — 77066 DX MAMMO INCL CAD BI: CPT | Performed by: OBSTETRICS & GYNECOLOGY

## 2021-08-11 ENCOUNTER — HOSPITAL ENCOUNTER (OUTPATIENT)
Dept: MRI IMAGING | Facility: HOSPITAL | Age: 56
Discharge: HOME | End: 2021-08-11
Attending: OBSTETRICS & GYNECOLOGY
Payer: COMMERCIAL

## 2021-08-11 DIAGNOSIS — R92.8 ABNORMAL MAMMOGRAM: ICD-10-CM

## 2021-08-11 PROCEDURE — A9575 INJ GADOTERATE MEGLUMI 0.1ML: HCPCS | Performed by: OBSTETRICS & GYNECOLOGY

## 2021-08-11 PROCEDURE — 77049 MRI BREAST C-+ W/CAD BI: CPT | Performed by: OBSTETRICS & GYNECOLOGY

## 2021-08-21 ENCOUNTER — TELEPHONE (OUTPATIENT)
Dept: INTERNAL MEDICINE CLINIC | Facility: HOSPITAL | Age: 56
End: 2021-08-21

## 2021-08-21 ENCOUNTER — HOSPITAL ENCOUNTER (OUTPATIENT)
Age: 56
Discharge: HOME | End: 2021-08-21
Payer: COMMERCIAL

## 2021-08-21 VITALS
HEART RATE: 77 BPM | BODY MASS INDEX: 25.9 KG/M2 | TEMPERATURE: 98 F | SYSTOLIC BLOOD PRESSURE: 117 MMHG | RESPIRATION RATE: 16 BRPM | HEIGHT: 71 IN | WEIGHT: 185 LBS | OXYGEN SATURATION: 95 % | DIASTOLIC BLOOD PRESSURE: 77 MMHG

## 2021-08-21 DIAGNOSIS — Z20.822 CLOSE EXPOSURE TO COVID-19 VIRUS: Primary | ICD-10-CM

## 2021-08-21 DIAGNOSIS — Z20.822 LAB TEST NEGATIVE FOR COVID-19 VIRUS: ICD-10-CM

## 2021-08-21 LAB — SARS-COV-2 RNA RESP QL NAA+PROBE: NOT DETECTED

## 2021-08-21 PROCEDURE — U0002 COVID-19 LAB TEST NON-CDC: HCPCS | Performed by: PHYSICIAN ASSISTANT

## 2021-08-21 PROCEDURE — 99202 OFFICE O/P NEW SF 15 MIN: CPT | Performed by: PHYSICIAN ASSISTANT

## 2021-08-21 NOTE — ED PROVIDER NOTES
Patient Seen in: [facility]      History   Patient presents with:  Covid-19 Test:  just tested positive. Want to test so I can return to work.  - Entered by patient    Stated Complaint: Covid-19 Test -  just tested posit reviewed. All other systems reviewed and negative except as noted above.     Physical Exam     ED Triage Vitals [08/21/21 1316]   /77   Pulse 77   Resp 16   Temp 98.3 °F (36.8 °C)   Temp src Temporal   SpO2 95 %   O2 Device None (Room air) possible for a visit   If symptoms worsen    [facility]  [address] [facility] [address]  [phone]    5-7 days for re-test          Medications Prescribed:  Current Discharge Medication List        I have given the pa

## 2021-08-21 NOTE — ED INITIAL ASSESSMENT (HPI)
Exposed to covid. Yesterday c/o itchy throat and today a headache. Denies fevers. Here for rapid covid test. covid vaccinated.

## 2021-08-21 NOTE — TELEPHONE ENCOUNTER
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 8/19/21  When are you next scheduled to work? 8/23/21    Did you have close contact with someone on your unit while not wearing a mask? (e.g., during meal breaks):  Yes []   No [x]    If yes, who:   Do you share a workspace?  Yes [] High-risk work exposure:    [] Asymptomatic and NOT vaccinated and no COVID infection in past 3 months: Remove from work. Order Alinity testing at                                       least 5 days from exposure.

## 2021-08-25 ENCOUNTER — LAB ENCOUNTER (OUTPATIENT)
Dept: LAB | Facility: HOSPITAL | Age: 56
End: 2021-08-25
Attending: PREVENTIVE MEDICINE

## 2021-08-25 DIAGNOSIS — Z20.822 CLOSE EXPOSURE TO COVID-19 VIRUS: ICD-10-CM

## 2021-08-26 LAB — SARS-COV-2 RNA RESP QL NAA+PROBE: NOT DETECTED

## 2021-08-26 NOTE — TELEPHONE ENCOUNTER
Results and RTW guidelines:    COVID RESULT:    [x] Viewed by employee in [address] RTW plan and instructions as indicated on triage call. Manager notified. Estimated RTW date: Continues to work.   [] Discussed with employee   [] Unable to reach by phon

## 2021-09-01 ENCOUNTER — LAB ENCOUNTER (OUTPATIENT)
Dept: LAB | Facility: HOSPITAL | Age: 56
End: 2021-09-01
Attending: PREVENTIVE MEDICINE

## 2021-09-01 ENCOUNTER — TELEPHONE (OUTPATIENT)
Dept: INTERNAL MEDICINE CLINIC | Facility: HOSPITAL | Age: 56
End: 2021-09-01

## 2021-09-01 DIAGNOSIS — Z20.822 CLOSE EXPOSURE TO COVID-19 VIRUS: ICD-10-CM

## 2021-09-01 DIAGNOSIS — Z20.822 CLOSE EXPOSURE TO COVID-19 VIRUS: Primary | ICD-10-CM

## 2021-09-01 NOTE — TELEPHONE ENCOUNTER
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  9/1/21  When are you next scheduled to work? 9/2/21    Did you have close contact with someone on your unit while not wearing a mask? (e.g., during meal breaks):  Yes [] from exposure. If negative may return to work after 7 days from exposure date (on day 8 after exposure).         [x] Asymptomatic AND vaccinated or COVID infection in past 3 months: May work and continue to monitor symp

## 2021-09-02 LAB — SARS-COV-2 RNA RESP QL NAA+PROBE: NOT DETECTED

## 2021-09-03 NOTE — TELEPHONE ENCOUNTER
Results and RTW guidelines: Reviewed results Via My Chart    Voicemail left confirmed that she reviewed her results and continue with plan discussed at triage. COVID RESULT:    [x] Viewed by employee in [address]   RTW plan and instructions as indicat be fever free for 24 hours w/o medications, Diarrhea/Vomiting free for 24 hours w/o medications  [] Alinity ordered; continue to monitor sx and call for new/worsening sx.   Discuss RTW guidelines with manager  [x] May continue to work  [] If test result is

## 2021-09-30 ENCOUNTER — IMMUNIZATION (OUTPATIENT)
Dept: LAB | Facility: HOSPITAL | Age: 56
End: 2021-09-30
Attending: EMERGENCY MEDICINE
Payer: COMMERCIAL

## 2021-09-30 DIAGNOSIS — Z23 NEED FOR VACCINATION: Primary | ICD-10-CM

## 2021-09-30 PROCEDURE — 0004A SARSCOV2 VAC 30MCG/0.3ML IM: CPT

## 2021-09-30 PROCEDURE — 0003A SARSCOV2 VAC 30MCG/0.3ML IM: CPT

## 2021-10-08 RX ORDER — LEVOTHYROXINE SODIUM 0.03 MG/1
TABLET ORAL
Qty: 90 TABLET | Refills: 2 | Status: SHIPPED | OUTPATIENT
Start: 2021-10-08

## 2021-10-16 ENCOUNTER — OFFICE VISIT (OUTPATIENT)
Dept: FAMILY MEDICINE CLINIC | Facility: CLINIC | Age: 56
End: 2021-10-16
Payer: COMMERCIAL

## 2021-10-16 VITALS
SYSTOLIC BLOOD PRESSURE: 112 MMHG | HEART RATE: 79 BPM | DIASTOLIC BLOOD PRESSURE: 82 MMHG | WEIGHT: 186 LBS | HEIGHT: 71 IN | RESPIRATION RATE: 20 BRPM | TEMPERATURE: 97 F | OXYGEN SATURATION: 98 % | BODY MASS INDEX: 26.04 KG/M2

## 2021-10-16 DIAGNOSIS — Z20.822 ENCOUNTER FOR LABORATORY TESTING FOR COVID-19 VIRUS: Primary | ICD-10-CM

## 2021-10-16 DIAGNOSIS — Z11.52 ENCOUNTER FOR SCREENING FOR COVID-19: Primary | ICD-10-CM

## 2021-10-16 PROCEDURE — 3008F BODY MASS INDEX DOCD: CPT | Performed by: NURSE PRACTITIONER

## 2021-10-16 PROCEDURE — 3079F DIAST BP 80-89 MM HG: CPT | Performed by: NURSE PRACTITIONER

## 2021-10-16 PROCEDURE — 3074F SYST BP LT 130 MM HG: CPT | Performed by: NURSE PRACTITIONER

## 2021-10-16 PROCEDURE — U0002 COVID-19 LAB TEST NON-CDC: HCPCS | Performed by: NURSE PRACTITIONER

## 2021-10-16 PROCEDURE — 99212 OFFICE O/P EST SF 10 MIN: CPT | Performed by: NURSE PRACTITIONER

## 2021-10-16 NOTE — PROGRESS NOTES
Patient sent to [facility] from another location for covid testing. Patient is traveling to [place] ([facility]) tomorrow morning and is requesting a rapid covid test. Advised on possibility rapid test not accepted for international travel; patient verbalized understanding.  Pa

## 2021-10-16 NOTE — PROGRESS NOTES
CHIEF COMPLAINT:   Patient presents with:  Covid: Need a pcr to travel out of country - Entered by patient    HPI:   Renetta Godinez is a 54year old female who presents for Covid 19 testing for travel. Going to [facility] ([facility] tomorrow. Requesting rapid testing.  Repor isn’t stressed?    • Visual impairment    • Wears glasses 1975   • Weight gain    • Weight loss 06/2019    Intentional      Past Surgical History:   Procedure Laterality Date   • COLONOSCOPY  2017   • D & C     • DILATION/CURETTAGE,DIAGNOSTIC  1993 Disease 2019 (COVID-19): Prevention  The best prevention is to have no contact with the SARS-CoV-2 virus, to follow safety precautions, and to get vaccinated The FDA has approved vaccines to prevent COVID-19 in people older than 18.  (One vaccine has been a feet from others as much as possible. This is called \"social distancing. \" You may be advised to stay at home and isolate yourself as much as possible if COVID-19 is in your area.  You may hear terms such as \"self isolate, \"quarantine,\" “stay at home,” SARS-CoV-2. But it's always a good idea to wash your hands after touching any animals. Don't touch animals that may be sick. · Don’t share eating or drinking utensils with sick people.     If you leave home    · Stay informed about safety instructions in y settings. For example, at an outdoor concert or sporting event. ? Can visit with other fully vaccinated people indoors without wearing masks or social distancing.   ? Can visit indoors without a mask or social distancing with unvaccinated people from a sin groups. · Clean work surfaces often with disinfectant. This includes desk surfaces, photocopier, printer, phones, kitchen counters, fridge door handle, bathroom surfaces, and others.   · Don’t touch other people’s personal work tools, such as phones, [facility]. See the CDC's symptom . · Your limits are different if you've had COVID-19 in the last 3 months but are fully recovered without symptoms and you have been exposed to someone with COVID-19.  If you are symptom-free, you don't need to [place] [employer] and print the 'Prescriptions from this Visit' Report

## 2022-06-09 ENCOUNTER — IMMUNIZATION (OUTPATIENT)
Dept: LAB | Age: 57
End: 2022-06-09
Attending: EMERGENCY MEDICINE
Payer: COMMERCIAL

## 2022-06-09 DIAGNOSIS — Z23 NEED FOR VACCINATION: Primary | ICD-10-CM

## 2022-06-09 PROCEDURE — 0054A SARSCOV2 VAC 30MCG TRS SUCR: CPT

## 2022-07-05 RX ORDER — LEVOTHYROXINE SODIUM 0.03 MG/1
TABLET ORAL
Qty: 90 TABLET | Refills: 0 | Status: SHIPPED | OUTPATIENT
Start: 2022-07-05 | End: 2022-12-19

## 2022-07-06 NOTE — TELEPHONE ENCOUNTER
Spoke to pt and she stated that she was at work and will schedule at a diff time.  Pt will call back

## 2022-07-08 ENCOUNTER — OFFICE VISIT (OUTPATIENT)
Dept: INTERNAL MEDICINE CLINIC | Facility: CLINIC | Age: 57
End: 2022-07-08
Payer: COMMERCIAL

## 2022-07-08 VITALS
DIASTOLIC BLOOD PRESSURE: 62 MMHG | HEIGHT: 71.26 IN | BODY MASS INDEX: 26.08 KG/M2 | RESPIRATION RATE: 16 BRPM | WEIGHT: 188.38 LBS | SYSTOLIC BLOOD PRESSURE: 110 MMHG | OXYGEN SATURATION: 100 % | TEMPERATURE: 98 F | HEART RATE: 70 BPM

## 2022-07-08 DIAGNOSIS — Z13.228 SCREENING FOR METABOLIC DISORDER: ICD-10-CM

## 2022-07-08 DIAGNOSIS — Z00.00 WELLNESS EXAMINATION: Primary | ICD-10-CM

## 2022-07-08 DIAGNOSIS — Z13.220 SCREENING FOR LIPID DISORDERS: ICD-10-CM

## 2022-07-08 DIAGNOSIS — E03.9 ACQUIRED HYPOTHYROIDISM: ICD-10-CM

## 2022-07-08 DIAGNOSIS — Z13.0 SCREENING FOR DISORDER OF BLOOD AND BLOOD-FORMING ORGANS: ICD-10-CM

## 2022-07-08 DIAGNOSIS — E66.3 OVERWEIGHT (BMI 25.0-29.9): ICD-10-CM

## 2022-07-08 PROBLEM — R06.83 SNORING: Status: RESOLVED | Noted: 2017-11-20 | Resolved: 2022-07-08

## 2022-07-08 PROCEDURE — 3074F SYST BP LT 130 MM HG: CPT | Performed by: FAMILY MEDICINE

## 2022-07-08 PROCEDURE — 3008F BODY MASS INDEX DOCD: CPT | Performed by: FAMILY MEDICINE

## 2022-07-08 PROCEDURE — 99396 PREV VISIT EST AGE 40-64: CPT | Performed by: FAMILY MEDICINE

## 2022-07-08 PROCEDURE — 3078F DIAST BP <80 MM HG: CPT | Performed by: FAMILY MEDICINE

## 2022-07-08 PROCEDURE — 90750 HZV VACC RECOMBINANT IM: CPT | Performed by: FAMILY MEDICINE

## 2022-07-08 PROCEDURE — 90471 IMMUNIZATION ADMIN: CPT | Performed by: FAMILY MEDICINE

## 2022-07-10 ENCOUNTER — TELEPHONE (OUTPATIENT)
Dept: INTERNAL MEDICINE CLINIC | Facility: HOSPITAL | Age: 57
End: 2022-07-10

## 2022-07-10 DIAGNOSIS — Z20.822 SUSPECTED COVID-19 VIRUS INFECTION: Primary | ICD-10-CM

## 2022-07-11 ENCOUNTER — LAB ENCOUNTER (OUTPATIENT)
Dept: LAB | Facility: HOSPITAL | Age: 57
End: 2022-07-11
Attending: PREVENTIVE MEDICINE

## 2022-07-11 DIAGNOSIS — Z20.822 SUSPECTED COVID-19 VIRUS INFECTION: ICD-10-CM

## 2022-07-11 LAB — SARS-COV-2 RNA RESP QL NAA+PROBE: NOT DETECTED

## 2022-07-11 NOTE — TELEPHONE ENCOUNTER
Results and RTW guidelines:    COVID RESULT:    [x] Viewed by employee in [address] RTW plan and instructions as indicated on triage call. Manager notified. Estimated RTW date:   [] Discussed with employee   [x] Unable to reach by phone. Sent via [employer] message      Test type:    [x] Rapid         [] Alinity         [] Outside test:       [x] NEGATIVE          Notes:     RTW PLAN:    []  If COVID positive results, off work minimum of 5 days from positive test or onset of symptoms (day 0)        On day 5, if asymptomatic or mildly symptomatic (with improving symptoms) may return to work day 6          On day 5, if symptomatic, call Employee Health for RTW screening        []  COVID positive result - call Employee Health on day 5 after symptom onset. The employee needs to be cleared by Employee Health to RTW. [x] RTW immediately, continue to monitor for sx  [] RTW when sx improve; must be fever free for 24 hours w/o medications, Diarrhea/Vomiting free for 24 hours w/o medications  [] Alinity ordered; continue to monitor sx and call for new/worsening sx.   Discuss RTW guidelines with manager  [] May continue to work  [] Follow up with PCP  [] Home until further instruction from hotline with Alinity results  INSTRUCTIONS PROVIDED:  [x]  Plan as noted above  []  Length of time to obtain results   []  Quarantine instructions  []  Masking protocol   []  S/S of worsening infection/condition and importance of prompt medical re-evaluation including when to seek emergency care  [] If symptoms develop, stay home and call hotline for rapid test order    Estimated RTW date:      [] The employee voiced understanding of above plan/instructions  [x] Manager Notified

## 2022-07-13 ENCOUNTER — MED REC SCAN ONLY (OUTPATIENT)
Dept: INTERNAL MEDICINE CLINIC | Facility: CLINIC | Age: 57
End: 2022-07-13

## 2022-07-13 NOTE — PROGRESS NOTES
Consult reviewed and signed by Dr. Cherise Matt ; sent to scanning with barcode. Pap updated in Health Maintenance.

## 2022-07-21 ENCOUNTER — TELEPHONE (OUTPATIENT)
Dept: INTERNAL MEDICINE CLINIC | Facility: CLINIC | Age: 57
End: 2022-07-21

## 2022-07-21 DIAGNOSIS — Z23 NEED FOR SHINGLES VACCINE: Primary | ICD-10-CM

## 2022-07-21 NOTE — TELEPHONE ENCOUNTER
2nd shingles shot scheduled for   Future Appointments   Date Time Provider Kami Jones   10/27/2022 10:00 AM [facility]

## 2022-10-05 ENCOUNTER — LAB ENCOUNTER (OUTPATIENT)
Dept: LAB | Age: 57
End: 2022-10-05
Attending: FAMILY MEDICINE
Payer: COMMERCIAL

## 2022-10-05 DIAGNOSIS — Z13.0 SCREENING FOR DISORDER OF BLOOD AND BLOOD-FORMING ORGANS: ICD-10-CM

## 2022-10-05 DIAGNOSIS — Z00.00 WELLNESS EXAMINATION: ICD-10-CM

## 2022-10-05 DIAGNOSIS — E03.9 ACQUIRED HYPOTHYROIDISM: ICD-10-CM

## 2022-10-05 DIAGNOSIS — Z13.228 SCREENING FOR METABOLIC DISORDER: ICD-10-CM

## 2022-10-05 DIAGNOSIS — Z13.220 SCREENING FOR LIPID DISORDERS: ICD-10-CM

## 2022-10-05 LAB
ALBUMIN SERPL-MCNC: 4.1 G/DL (ref 3.4–5)
ALBUMIN/GLOB SERPL: 1.3 {RATIO} (ref 1–2)
ALP LIVER SERPL-CCNC: 83 U/L
ALT SERPL-CCNC: 30 U/L
ANION GAP SERPL CALC-SCNC: 5 MMOL/L (ref 0–18)
AST SERPL-CCNC: 22 U/L (ref 15–37)
BASOPHILS # BLD AUTO: 0.03 X10(3) UL (ref 0–0.2)
BASOPHILS NFR BLD AUTO: 0.8 %
BILIRUB SERPL-MCNC: 0.7 MG/DL (ref 0.1–2)
BUN BLD-MCNC: 15 MG/DL (ref 7–18)
BUN/CREAT SERPL: 17.4 (ref 10–20)
CALCIUM BLD-MCNC: 9.1 MG/DL (ref 8.5–10.1)
CHLORIDE SERPL-SCNC: 109 MMOL/L (ref 98–112)
CHOLEST SERPL-MCNC: 203 MG/DL (ref ?–200)
CO2 SERPL-SCNC: 27 MMOL/L (ref 21–32)
CREAT BLD-MCNC: 0.86 MG/DL
DEPRECATED RDW RBC AUTO: 38.1 FL (ref 35.1–46.3)
EOSINOPHIL # BLD AUTO: 0.07 X10(3) UL (ref 0–0.7)
EOSINOPHIL NFR BLD AUTO: 1.8 %
ERYTHROCYTE [DISTWIDTH] IN BLOOD BY AUTOMATED COUNT: 11.8 % (ref 11–15)
FASTING PATIENT LIPID ANSWER: YES
FASTING STATUS PATIENT QL REPORTED: YES
GFR SERPLBLD BASED ON 1.73 SQ M-ARVRAT: 79 ML/MIN/1.73M2 (ref 60–?)
GLOBULIN PLAS-MCNC: 3.2 G/DL (ref 2.8–4.4)
GLUCOSE BLD-MCNC: 95 MG/DL (ref 70–99)
HCT VFR BLD AUTO: 39.4 %
HDLC SERPL-MCNC: 56 MG/DL (ref 40–59)
HGB BLD-MCNC: 13.2 G/DL
IMM GRANULOCYTES # BLD AUTO: 0 X10(3) UL (ref 0–1)
IMM GRANULOCYTES NFR BLD: 0 %
LDLC SERPL CALC-MCNC: 136 MG/DL (ref ?–100)
LYMPHOCYTES # BLD AUTO: 1.52 X10(3) UL (ref 1–4)
LYMPHOCYTES NFR BLD AUTO: 38.6 %
MCH RBC QN AUTO: 29.6 PG (ref 26–34)
MCHC RBC AUTO-ENTMCNC: 33.5 G/DL (ref 31–37)
MCV RBC AUTO: 88.3 FL
MONOCYTES # BLD AUTO: 0.32 X10(3) UL (ref 0.1–1)
MONOCYTES NFR BLD AUTO: 8.1 %
NEUTROPHILS # BLD AUTO: 2 X10 (3) UL (ref 1.5–7.7)
NEUTROPHILS # BLD AUTO: 2 X10(3) UL (ref 1.5–7.7)
NEUTROPHILS NFR BLD AUTO: 50.7 %
NONHDLC SERPL-MCNC: 147 MG/DL (ref ?–130)
OSMOLALITY SERPL CALC.SUM OF ELEC: 293 MOSM/KG (ref 275–295)
PLATELET # BLD AUTO: 292 10(3)UL (ref 150–450)
POTASSIUM SERPL-SCNC: 4.4 MMOL/L (ref 3.5–5.1)
PROT SERPL-MCNC: 7.3 G/DL (ref 6.4–8.2)
RBC # BLD AUTO: 4.46 X10(6)UL
SODIUM SERPL-SCNC: 141 MMOL/L (ref 136–145)
TRIGL SERPL-MCNC: 58 MG/DL (ref 30–149)
TSI SER-ACNC: 3.74 MIU/ML (ref 0.36–3.74)
VLDLC SERPL CALC-MCNC: 11 MG/DL (ref 0–30)
WBC # BLD AUTO: 3.9 X10(3) UL (ref 4–11)

## 2022-10-05 PROCEDURE — 80061 LIPID PANEL: CPT | Performed by: FAMILY MEDICINE

## 2022-10-05 PROCEDURE — 80050 GENERAL HEALTH PANEL: CPT | Performed by: FAMILY MEDICINE

## 2022-10-27 ENCOUNTER — NURSE ONLY (OUTPATIENT)
Dept: INTERNAL MEDICINE CLINIC | Facility: CLINIC | Age: 57
End: 2022-10-27
Payer: COMMERCIAL

## 2022-10-27 PROCEDURE — 90750 HZV VACC RECOMBINANT IM: CPT | Performed by: FAMILY MEDICINE

## 2022-10-27 PROCEDURE — 90471 IMMUNIZATION ADMIN: CPT | Performed by: FAMILY MEDICINE

## 2022-12-06 ENCOUNTER — IMMUNIZATION (OUTPATIENT)
Dept: LAB | Age: 57
End: 2022-12-06
Attending: EMERGENCY MEDICINE
Payer: COMMERCIAL

## 2022-12-06 DIAGNOSIS — Z23 NEED FOR VACCINATION: Primary | ICD-10-CM

## 2022-12-06 PROCEDURE — 0124A SARSCOV2 VAC BVL 30MCG/0.3ML: CPT

## 2022-12-19 RX ORDER — LEVOTHYROXINE SODIUM 0.03 MG/1
TABLET ORAL
Qty: 90 TABLET | Refills: 0 | Status: SHIPPED | OUTPATIENT
Start: 2022-12-19

## 2023-01-09 DIAGNOSIS — Z12.39 BREAST CANCER SCREENING OTHER THAN MAMMOGRAM: ICD-10-CM

## 2023-01-09 DIAGNOSIS — Z12.31 BREAST CANCER SCREENING BY MAMMOGRAM: Primary | ICD-10-CM

## 2023-02-10 ENCOUNTER — ORDER TRANSCRIPTION (OUTPATIENT)
Dept: ADMINISTRATIVE | Facility: HOSPITAL | Age: 58
End: 2023-02-10

## 2023-02-10 DIAGNOSIS — Z13.6 SCREENING FOR CARDIOVASCULAR CONDITION: Primary | ICD-10-CM

## 2023-02-15 ENCOUNTER — HOSPITAL ENCOUNTER (OUTPATIENT)
Dept: MAMMOGRAPHY | Facility: HOSPITAL | Age: 58
Discharge: HOME | End: 2023-02-15
Attending: PHYSICIAN ASSISTANT
Payer: COMMERCIAL

## 2023-02-15 DIAGNOSIS — Z12.31 BREAST CANCER SCREENING BY MAMMOGRAM: ICD-10-CM

## 2023-02-15 PROCEDURE — 77063 BREAST TOMOSYNTHESIS BI: CPT | Performed by: PHYSICIAN ASSISTANT

## 2023-02-15 PROCEDURE — 77067 SCR MAMMO BI INCL CAD: CPT | Performed by: PHYSICIAN ASSISTANT

## 2023-02-23 ENCOUNTER — ORDER TRANSCRIPTION (OUTPATIENT)
Dept: ADMINISTRATIVE | Facility: HOSPITAL | Age: 58
End: 2023-02-23

## 2023-02-23 ENCOUNTER — HOSPITAL ENCOUNTER (OUTPATIENT)
Dept: CT IMAGING | Facility: HOSPITAL | Age: 58
Discharge: HOME | End: 2023-02-23
Attending: FAMILY MEDICINE

## 2023-02-23 VITALS — HEIGHT: 71 IN | BODY MASS INDEX: 26.32 KG/M2 | WEIGHT: 188 LBS

## 2023-02-23 DIAGNOSIS — Z13.9 ENCOUNTER FOR SCREENING: Primary | ICD-10-CM

## 2023-02-23 DIAGNOSIS — Z13.6 SCREENING FOR CARDIOVASCULAR CONDITION: ICD-10-CM

## 2023-02-23 LAB
GLUCOSE POC: 99 MG/DL (ref 70–100)
HDL POC: 58 MG/DL (ref 40–60)
LDL POC: 120 MG/DL (ref 0–130)
TC/HDL RATIO: 3 (ref 0–5)
TOTAL CHOLESTEROL POC: 189 MG/DL (ref 0–200)
TRIGLYCERIDES POC: 55 MG/DL (ref 0–200)

## 2023-03-14 RX ORDER — LEVOTHYROXINE SODIUM 0.03 MG/1
TABLET ORAL
Qty: 90 TABLET | Refills: 0 | Status: SHIPPED | OUTPATIENT
Start: 2023-03-14

## 2023-03-28 ENCOUNTER — HOSPITAL ENCOUNTER (OUTPATIENT)
Dept: MAMMOGRAPHY | Facility: HOSPITAL | Age: 58
Discharge: HOME | End: 2023-03-28
Attending: PHYSICIAN ASSISTANT
Payer: COMMERCIAL

## 2023-03-28 DIAGNOSIS — Z12.39 BREAST CANCER SCREENING OTHER THAN MAMMOGRAM: ICD-10-CM

## 2023-03-28 PROCEDURE — 76641 ULTRASOUND BREAST COMPLETE: CPT | Performed by: PHYSICIAN ASSISTANT

## 2023-05-11 ENCOUNTER — TELEPHONE (OUTPATIENT)
Dept: INTERNAL MEDICINE CLINIC | Facility: CLINIC | Age: 58
End: 2023-05-11

## 2023-05-11 RX ORDER — MECLIZINE HCL 12.5 MG/1
12.5 TABLET ORAL 3 TIMES DAILY PRN
Qty: 10 TABLET | Refills: 0 | Status: SHIPPED | OUTPATIENT
Start: 2023-05-11

## 2023-05-11 NOTE — TELEPHONE ENCOUNTER
Spoke to patient. ER precautions provided. Informed medication sent and to f/u in office early next week if not better. Pt verbs understanding.

## 2023-05-11 NOTE — TELEPHONE ENCOUNTER
LOV 7/8/22    Pt reports was doing yoga/sit-ups this AM and developed vertigo; states the has been spinning for approx 15 min. Has had this in the past, approx in 2020. Meclizine helped in past and also saw ENT at that time for [facility]"- hasn't had vertigo since. States she was told her vertigo last time was linked to taking caffeine tablets, and she admits she has restarted taking a 1/2 of caffeine tab recently because she stopped drinking tea to whiten teeth. She states she will stop them immediately. Symptoms improve while lying still, but once stands, the room starts spinning again. Mild improvement since onset. She's requesting meclizine be sent. Please advise if willing to send. Denies recent illness or ear sx. No fever, other neuro sx, cp, palpitations, or urgent sx.

## 2023-05-24 ENCOUNTER — HOSPITAL ENCOUNTER (OUTPATIENT)
Dept: ULTRASOUND IMAGING | Age: 58
Discharge: HOME | End: 2023-05-24
Attending: FAMILY MEDICINE

## 2023-05-24 DIAGNOSIS — Z13.9 ENCOUNTER FOR SCREENING: ICD-10-CM

## 2023-05-24 NOTE — PROGRESS NOTES
Pt has completed the PV screening 5/24/23. She has viewed her final results via "[employer].

## 2023-05-25 ENCOUNTER — TELEPHONE (OUTPATIENT)
Dept: INTERNAL MEDICINE CLINIC | Facility: CLINIC | Age: 58
End: 2023-05-25

## 2023-05-25 DIAGNOSIS — Z13.228 SCREENING FOR METABOLIC DISORDER: ICD-10-CM

## 2023-05-25 DIAGNOSIS — Z00.00 ROUTINE GENERAL MEDICAL EXAMINATION AT A HEALTH CARE FACILITY: Primary | ICD-10-CM

## 2023-05-25 DIAGNOSIS — Z13.29 SCREENING FOR THYROID DISORDER: ICD-10-CM

## 2023-05-25 DIAGNOSIS — Z13.220 SCREENING FOR LIPID DISORDERS: ICD-10-CM

## 2023-05-25 DIAGNOSIS — Z13.0 SCREENING FOR DISORDER OF BLOOD AND BLOOD-FORMING ORGANS: ICD-10-CM

## 2023-05-25 NOTE — PROGRESS NOTES
Patient completed the PV screening on 5/24/23. Patient has viewed her Final Results in My Chart and here PCP office sent her a message about results in My Chart.

## 2023-05-25 NOTE — TELEPHONE ENCOUNTER
Future Appointments   Date Time Provider Kami Karen   8/3/2023  8:00 AM Mayco Pickett MD [facility]     Informed must fast no call back required.  Orders to [facility] [place] [facility]

## 2023-06-12 RX ORDER — LEVOTHYROXINE SODIUM 0.03 MG/1
TABLET ORAL
Qty: 90 TABLET | Refills: 0 | Status: SHIPPED | OUTPATIENT
Start: 2023-06-12

## 2023-06-12 NOTE — TELEPHONE ENCOUNTER
PASSED per protocol, refill sent.   Last PE 7.8.22-PE scheduled for 8.3.23   Future Appointments   Date Time Provider [facility] Karen   8/3/2023  8:00 AM Harman Canales MD [facility]

## 2023-07-11 ENCOUNTER — OFFICE VISIT (OUTPATIENT)
Dept: FAMILY MEDICINE CLINIC | Facility: CLINIC | Age: 58
End: 2023-07-11
Payer: COMMERCIAL

## 2023-07-11 VITALS
BODY MASS INDEX: 25.62 KG/M2 | TEMPERATURE: 98 F | HEART RATE: 73 BPM | SYSTOLIC BLOOD PRESSURE: 112 MMHG | WEIGHT: 183 LBS | DIASTOLIC BLOOD PRESSURE: 74 MMHG | RESPIRATION RATE: 18 BRPM | HEIGHT: 71 IN | OXYGEN SATURATION: 99 %

## 2023-07-11 DIAGNOSIS — N30.91 CYSTITIS WITH HEMATURIA: Primary | ICD-10-CM

## 2023-07-11 DIAGNOSIS — R32 URINARY INCONTINENCE IN FEMALE: ICD-10-CM

## 2023-07-11 DIAGNOSIS — R39.9 UTI SYMPTOMS: ICD-10-CM

## 2023-07-11 LAB
APPEARANCE: CLEAR
BILIRUBIN: NEGATIVE
GLUCOSE (URINE DIPSTICK): NEGATIVE MG/DL
KETONES (URINE DIPSTICK): NEGATIVE MG/DL
MULTISTIX LOT#: ABNORMAL NUMERIC
NITRITE, URINE: NEGATIVE
PH, URINE: 5.5 (ref 4.5–8)
PROTEIN (URINE DIPSTICK): NEGATIVE MG/DL
SPECIFIC GRAVITY: 1 (ref 1–1.03)
URINE-COLOR: YELLOW
UROBILINOGEN,SEMI-QN: 0.2 MG/DL (ref 0–1.9)

## 2023-07-11 PROCEDURE — 3074F SYST BP LT 130 MM HG: CPT | Performed by: NURSE PRACTITIONER

## 2023-07-11 PROCEDURE — 87086 URINE CULTURE/COLONY COUNT: CPT | Performed by: NURSE PRACTITIONER

## 2023-07-11 PROCEDURE — 3078F DIAST BP <80 MM HG: CPT | Performed by: NURSE PRACTITIONER

## 2023-07-11 PROCEDURE — 99213 OFFICE O/P EST LOW 20 MIN: CPT | Performed by: NURSE PRACTITIONER

## 2023-07-11 PROCEDURE — 81003 URINALYSIS AUTO W/O SCOPE: CPT | Performed by: NURSE PRACTITIONER

## 2023-07-11 PROCEDURE — 3008F BODY MASS INDEX DOCD: CPT | Performed by: NURSE PRACTITIONER

## 2023-07-11 RX ORDER — NITROFURANTOIN 25; 75 MG/1; MG/1
100 CAPSULE ORAL 2 TIMES DAILY
Qty: 10 CAPSULE | Refills: 0 | Status: SHIPPED | OUTPATIENT
Start: 2023-07-11 | End: 2023-07-16

## 2023-07-31 ENCOUNTER — LAB ENCOUNTER (OUTPATIENT)
Dept: LAB | Age: 58
End: 2023-07-31
Attending: PHYSICIAN ASSISTANT
Payer: COMMERCIAL

## 2023-07-31 DIAGNOSIS — Z13.228 SCREENING FOR METABOLIC DISORDER: ICD-10-CM

## 2023-07-31 DIAGNOSIS — Z13.0 SCREENING FOR DISORDER OF BLOOD AND BLOOD-FORMING ORGANS: ICD-10-CM

## 2023-07-31 DIAGNOSIS — Z13.29 SCREENING FOR THYROID DISORDER: ICD-10-CM

## 2023-07-31 DIAGNOSIS — Z13.220 SCREENING FOR LIPID DISORDERS: ICD-10-CM

## 2023-07-31 DIAGNOSIS — R82.90 ABNORMAL FINDING ON URINALYSIS: ICD-10-CM

## 2023-07-31 DIAGNOSIS — Z00.00 ROUTINE GENERAL MEDICAL EXAMINATION AT A HEALTH CARE FACILITY: ICD-10-CM

## 2023-07-31 DIAGNOSIS — N39.0 URINARY TRACT INFECTION WITH HEMATURIA, SITE UNSPECIFIED: ICD-10-CM

## 2023-07-31 DIAGNOSIS — R31.9 URINARY TRACT INFECTION WITH HEMATURIA, SITE UNSPECIFIED: ICD-10-CM

## 2023-07-31 LAB
ALBUMIN SERPL-MCNC: 3.9 G/DL (ref 3.4–5)
ALBUMIN/GLOB SERPL: 1.2 {RATIO} (ref 1–2)
ALP LIVER SERPL-CCNC: 77 U/L
ALT SERPL-CCNC: 29 U/L
ANION GAP SERPL CALC-SCNC: 4 MMOL/L (ref 0–18)
AST SERPL-CCNC: 17 U/L (ref 15–37)
BASOPHILS # BLD AUTO: 0.04 X10(3) UL (ref 0–0.2)
BASOPHILS NFR BLD AUTO: 0.8 %
BILIRUB SERPL-MCNC: 0.6 MG/DL (ref 0.1–2)
BILIRUB UR QL STRIP.AUTO: NEGATIVE
BUN BLD-MCNC: 15 MG/DL (ref 7–18)
CALCIUM BLD-MCNC: 9.2 MG/DL (ref 8.5–10.1)
CHLORIDE SERPL-SCNC: 109 MMOL/L (ref 98–112)
CHOLEST SERPL-MCNC: 217 MG/DL (ref ?–200)
CO2 SERPL-SCNC: 26 MMOL/L (ref 21–32)
COLOR UR AUTO: YELLOW
CREAT BLD-MCNC: 1.03 MG/DL
EGFRCR SERPLBLD CKD-EPI 2021: 63 ML/MIN/1.73M2 (ref 60–?)
EOSINOPHIL # BLD AUTO: 0.1 X10(3) UL (ref 0–0.7)
EOSINOPHIL NFR BLD AUTO: 2 %
ERYTHROCYTE [DISTWIDTH] IN BLOOD BY AUTOMATED COUNT: 12.1 %
FASTING PATIENT LIPID ANSWER: YES
FASTING STATUS PATIENT QL REPORTED: YES
GLOBULIN PLAS-MCNC: 3.3 G/DL (ref 2.8–4.4)
GLUCOSE BLD-MCNC: 87 MG/DL (ref 70–99)
GLUCOSE UR STRIP.AUTO-MCNC: NEGATIVE MG/DL
HCT VFR BLD AUTO: 41.1 %
HDLC SERPL-MCNC: 60 MG/DL (ref 40–59)
HGB BLD-MCNC: 13 G/DL
IMM GRANULOCYTES # BLD AUTO: 0.01 X10(3) UL (ref 0–1)
IMM GRANULOCYTES NFR BLD: 0.2 %
KETONES UR STRIP.AUTO-MCNC: NEGATIVE MG/DL
LDLC SERPL CALC-MCNC: 137 MG/DL (ref ?–100)
LYMPHOCYTES # BLD AUTO: 1.9 X10(3) UL (ref 1–4)
LYMPHOCYTES NFR BLD AUTO: 38.6 %
MCH RBC QN AUTO: 28.7 PG (ref 26–34)
MCHC RBC AUTO-ENTMCNC: 31.6 G/DL (ref 31–37)
MCV RBC AUTO: 90.7 FL
MONOCYTES # BLD AUTO: 0.38 X10(3) UL (ref 0.1–1)
MONOCYTES NFR BLD AUTO: 7.7 %
NEUTROPHILS # BLD AUTO: 2.49 X10 (3) UL (ref 1.5–7.7)
NEUTROPHILS # BLD AUTO: 2.49 X10(3) UL (ref 1.5–7.7)
NEUTROPHILS NFR BLD AUTO: 50.7 %
NITRITE UR QL STRIP.AUTO: NEGATIVE
NONHDLC SERPL-MCNC: 157 MG/DL (ref ?–130)
OSMOLALITY SERPL CALC.SUM OF ELEC: 288 MOSM/KG (ref 275–295)
PH UR STRIP.AUTO: 5 [PH] (ref 5–8)
PLATELET # BLD AUTO: 295 10(3)UL (ref 150–450)
POTASSIUM SERPL-SCNC: 3.7 MMOL/L (ref 3.5–5.1)
PROT SERPL-MCNC: 7.2 G/DL (ref 6.4–8.2)
PROT UR STRIP.AUTO-MCNC: NEGATIVE MG/DL
RBC # BLD AUTO: 4.53 X10(6)UL
RBC UR QL AUTO: NEGATIVE
SODIUM SERPL-SCNC: 139 MMOL/L (ref 136–145)
SP GR UR STRIP.AUTO: 1.03 (ref 1–1.03)
TRIGL SERPL-MCNC: 111 MG/DL (ref 30–149)
TSI SER-ACNC: 2.96 MIU/ML (ref 0.36–3.74)
UROBILINOGEN UR STRIP.AUTO-MCNC: <2 MG/DL
VLDLC SERPL CALC-MCNC: 20 MG/DL (ref 0–30)
WBC # BLD AUTO: 4.9 X10(3) UL (ref 4–11)

## 2023-07-31 PROCEDURE — 80050 GENERAL HEALTH PANEL: CPT | Performed by: FAMILY MEDICINE

## 2023-07-31 PROCEDURE — 80061 LIPID PANEL: CPT | Performed by: FAMILY MEDICINE

## 2023-08-03 ENCOUNTER — OFFICE VISIT (OUTPATIENT)
Dept: INTERNAL MEDICINE CLINIC | Facility: CLINIC | Age: 58
End: 2023-08-03
Payer: COMMERCIAL

## 2023-08-03 VITALS
BODY MASS INDEX: 26.41 KG/M2 | HEART RATE: 93 BPM | WEIGHT: 188.63 LBS | RESPIRATION RATE: 20 BRPM | DIASTOLIC BLOOD PRESSURE: 66 MMHG | OXYGEN SATURATION: 97 % | SYSTOLIC BLOOD PRESSURE: 116 MMHG | HEIGHT: 71 IN

## 2023-08-03 DIAGNOSIS — Z00.00 WELLNESS EXAMINATION: Primary | ICD-10-CM

## 2023-08-03 DIAGNOSIS — E03.9 ACQUIRED HYPOTHYROIDISM: ICD-10-CM

## 2023-08-03 DIAGNOSIS — Z86.010 HISTORY OF ADENOMATOUS POLYP OF COLON: ICD-10-CM

## 2023-08-03 DIAGNOSIS — E66.3 OVERWEIGHT (BMI 25.0-29.9): ICD-10-CM

## 2023-08-03 PROCEDURE — 3008F BODY MASS INDEX DOCD: CPT | Performed by: FAMILY MEDICINE

## 2023-08-03 PROCEDURE — 99396 PREV VISIT EST AGE 40-64: CPT | Performed by: FAMILY MEDICINE

## 2023-08-03 PROCEDURE — 3078F DIAST BP <80 MM HG: CPT | Performed by: FAMILY MEDICINE

## 2023-08-03 PROCEDURE — 3074F SYST BP LT 130 MM HG: CPT | Performed by: FAMILY MEDICINE

## 2023-09-11 RX ORDER — LEVOTHYROXINE SODIUM 0.03 MG/1
TABLET ORAL
Qty: 90 TABLET | Refills: 1 | Status: SHIPPED | OUTPATIENT
Start: 2023-09-11

## 2023-09-11 NOTE — TELEPHONE ENCOUNTER
Thyroid Supplements Protocol [id number]/10/2023 05:43 AM   Protocol Details TSH test in past 12 months    TSH value between 0.350 and 5.500 IU/ml    Appointment in past 12 or next 3 months       Last wellness visit 8/3/23        Component  Ref Range & Units 7/31/23  8:06 AM   TSH  0.358 - 3.740 mIU/mL 2.960

## 2023-10-20 ENCOUNTER — OFFICE VISIT (OUTPATIENT)
Dept: INTERNAL MEDICINE CLINIC | Facility: CLINIC | Age: 58
End: 2023-10-20
Payer: COMMERCIAL

## 2023-10-20 VITALS
TEMPERATURE: 97 F | WEIGHT: 192.38 LBS | DIASTOLIC BLOOD PRESSURE: 66 MMHG | HEART RATE: 75 BPM | RESPIRATION RATE: 16 BRPM | BODY MASS INDEX: 27 KG/M2 | SYSTOLIC BLOOD PRESSURE: 108 MMHG | OXYGEN SATURATION: 99 %

## 2023-10-20 DIAGNOSIS — M75.102 ROTATOR CUFF SYNDROME OF LEFT SHOULDER: ICD-10-CM

## 2023-10-20 DIAGNOSIS — G89.29 CHRONIC LEFT SHOULDER PAIN: Primary | ICD-10-CM

## 2023-10-20 DIAGNOSIS — M25.512 CHRONIC LEFT SHOULDER PAIN: Primary | ICD-10-CM

## 2023-10-20 PROCEDURE — 3078F DIAST BP <80 MM HG: CPT | Performed by: FAMILY MEDICINE

## 2023-10-20 PROCEDURE — 3074F SYST BP LT 130 MM HG: CPT | Performed by: FAMILY MEDICINE

## 2023-10-20 PROCEDURE — 99214 OFFICE O/P EST MOD 30 MIN: CPT | Performed by: FAMILY MEDICINE

## 2023-10-23 ENCOUNTER — HOSPITAL ENCOUNTER (OUTPATIENT)
Dept: GENERAL RADIOLOGY | Facility: HOSPITAL | Age: 58
Discharge: HOME | End: 2023-10-23
Attending: FAMILY MEDICINE

## 2023-10-23 DIAGNOSIS — M25.512 CHRONIC LEFT SHOULDER PAIN: ICD-10-CM

## 2023-10-23 DIAGNOSIS — G89.29 CHRONIC LEFT SHOULDER PAIN: ICD-10-CM

## 2023-10-23 DIAGNOSIS — M75.102 ROTATOR CUFF SYNDROME OF LEFT SHOULDER: ICD-10-CM

## 2023-10-23 PROCEDURE — 73030 X-RAY EXAM OF SHOULDER: CPT | Performed by: FAMILY MEDICINE

## 2023-11-20 ENCOUNTER — OFFICE VISIT (OUTPATIENT)
Dept: PODIATRY CLINIC | Facility: CLINIC | Age: 58
End: 2023-11-20
Payer: COMMERCIAL

## 2023-11-20 DIAGNOSIS — M20.41 HAMMER TOE OF RIGHT FOOT: ICD-10-CM

## 2023-11-20 DIAGNOSIS — B35.1 DERMATOPHYTOSIS OF NAIL: Primary | ICD-10-CM

## 2023-11-20 DIAGNOSIS — B35.1 ONYCHOMYCOSIS: ICD-10-CM

## 2023-11-20 PROCEDURE — 99203 OFFICE O/P NEW LOW 30 MIN: CPT | Performed by: STUDENT IN AN ORGANIZED HEALTH CARE EDUCATION/TRAINING PROGRAM

## 2024-03-08 RX ORDER — LEVOTHYROXINE SODIUM 0.03 MG/1
TABLET ORAL
Qty: 90 TABLET | Refills: 0 | Status: SHIPPED | OUTPATIENT
Start: 2024-03-08

## 2024-03-13 ENCOUNTER — HOSPITAL ENCOUNTER (OUTPATIENT)
Dept: MAMMOGRAPHY | Facility: HOSPITAL | Age: 59
Discharge: HOME | End: 2024-03-13
Attending: PHYSICIAN ASSISTANT
Payer: COMMERCIAL

## 2024-03-13 DIAGNOSIS — Z12.31 ENCOUNTER FOR SCREENING MAMMOGRAM FOR MALIGNANT NEOPLASM OF BREAST: ICD-10-CM

## 2024-03-13 PROCEDURE — 77067 SCR MAMMO BI INCL CAD: CPT | Performed by: PHYSICIAN ASSISTANT

## 2024-03-13 PROCEDURE — 77063 BREAST TOMOSYNTHESIS BI: CPT | Performed by: PHYSICIAN ASSISTANT

## 2024-03-19 DIAGNOSIS — R92.333 HETEROGENEOUSLY DENSE TISSUE OF BOTH BREASTS ON MAMMOGRAPHY: Primary | ICD-10-CM

## 2024-04-02 ENCOUNTER — HOSPITAL ENCOUNTER (OUTPATIENT)
Dept: MAMMOGRAPHY | Facility: HOSPITAL | Age: 59
Discharge: HOME | End: 2024-04-02
Attending: PHYSICIAN ASSISTANT
Payer: COMMERCIAL

## 2024-04-02 DIAGNOSIS — R92.333 HETEROGENEOUSLY DENSE TISSUE OF BOTH BREASTS ON MAMMOGRAPHY: ICD-10-CM

## 2024-04-02 PROCEDURE — 76641 ULTRASOUND BREAST COMPLETE: CPT | Performed by: PHYSICIAN ASSISTANT

## 2024-06-04 ENCOUNTER — OFFICE VISIT (OUTPATIENT)
Dept: FAMILY MEDICINE CLINIC | Facility: CLINIC | Age: 59
End: 2024-06-04
Payer: COMMERCIAL

## 2024-06-04 VITALS
HEART RATE: 73 BPM | TEMPERATURE: 98 F | WEIGHT: 190 LBS | HEIGHT: 71 IN | SYSTOLIC BLOOD PRESSURE: 115 MMHG | RESPIRATION RATE: 16 BRPM | DIASTOLIC BLOOD PRESSURE: 73 MMHG | BODY MASS INDEX: 26.6 KG/M2 | OXYGEN SATURATION: 98 %

## 2024-06-04 DIAGNOSIS — M54.50 ACUTE BILATERAL LOW BACK PAIN WITHOUT SCIATICA: Primary | ICD-10-CM

## 2024-06-04 PROCEDURE — 3074F SYST BP LT 130 MM HG: CPT | Performed by: NURSE PRACTITIONER

## 2024-06-04 PROCEDURE — 3078F DIAST BP <80 MM HG: CPT | Performed by: NURSE PRACTITIONER

## 2024-06-04 PROCEDURE — 3008F BODY MASS INDEX DOCD: CPT | Performed by: NURSE PRACTITIONER

## 2024-06-04 PROCEDURE — 99213 OFFICE O/P EST LOW 20 MIN: CPT | Performed by: NURSE PRACTITIONER

## 2024-06-04 RX ORDER — CYCLOBENZAPRINE HCL 10 MG
10 TABLET ORAL 3 TIMES DAILY PRN
Qty: 15 TABLET | Refills: 0 | Status: SHIPPED | OUTPATIENT
Start: 2024-06-04

## 2024-06-04 RX ORDER — POLYETHYLENE GLYCOL 3350 17 G/17G
17 POWDER, FOR SOLUTION ORAL DAILY
COMMUNITY

## 2024-06-04 RX ORDER — NAPROXEN 500 MG/1
500 TABLET ORAL 2 TIMES DAILY WITH MEALS
Qty: 28 TABLET | Refills: 0 | Status: SHIPPED | OUTPATIENT
Start: 2024-06-04 | End: 2024-06-18

## 2024-06-04 NOTE — PATIENT INSTRUCTIONS
Do not drive or drink alcohol while on muscle relaxer as it may cause sedation  Take naproxen with food. Stop medication with any GI upset or signs of bleeding  Follow up with your primary care doctor next week.   Seek urgent follow up for new or worsening symtpoms.

## 2024-06-04 NOTE — PROGRESS NOTES
CHIEF COMPLAINT:     Chief Complaint   Patient presents with    Back Pain     X 1 week, lower back, worsening last night, OTC tylenol        HPI:   Lorenza Alfaro is a 58 year old female who is here for complaints of low back pain.  Pain is located across the low back just above sacrum/  Pain is described as  pressure , aching.  Severity is  5 on a scale of 1-10 at that times. Reports at other times the pain is 8/10.. The pain does not radiate. Denies any trauma. Unsure what precipitated the pain. Has had for 1  weeks. Pain is worsened by bending forward and sitting in a soft chair. Gets relief of pain with  sitting in a firm chair, lying down with heating pad . Treated pain with tylenol without much relief. Prior back pain hx: recurrent self limited episodes of low back pain in the past- last occurrence was several years ago.   Denies jose luis loss of bowel or bladder control.    Current Outpatient Medications   Medication Sig Dispense Refill    levothyroxine 25 MCG Oral Tab TAKE 1 TABLET(25 MCG) BY MOUTH BEFORE BREAKFAST 90 tablet 0    loratadine 10 MG Oral Tab Take 1 tablet (10 mg total) by mouth daily.      Multiple Vitamins-Minerals (MULTI-VITAMIN/MINERALS) Oral Tab Take 1 tablet by mouth daily.      Calcium Carbonate-Vitamin D 600-400 MG-UNIT Oral Tab Take 1 tablet by mouth daily.      meclizine 12.5 MG Oral Tab Take 1 tablet (12.5 mg total) by mouth 3 (three) times daily as needed. 10 tablet 0    docusate sodium 100 MG Oral Cap Take 1 capsule (100 mg total) by mouth 2 (two) times daily.      Psyllium (METAMUCIL FIBER OR) Take by mouth 2 (two) times daily.      Melatonin 5 MG Oral Tab Take 1 tablet (5 mg total) by mouth nightly. Extra 3mg if needed      Acetaminophen (TYLENOL OR) prn        Past Medical History:    Constipation    Detached retina, right    Exposure to TB    At work    Frequent use of laxatives    To become more regular    Heartburn    Heavy menses    Hemorrhoids    HOSPITALIZATIONS    viral  meningtitis    Irregular bowel habits    Menses painful    Migraines    Nausea    At work. 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    Visual impairment    Wears glasses    Weight gain    Weight loss    Intentional      Social History:  Social History     Socioeconomic History    Marital status:    Tobacco Use    Smoking status: Never    Smokeless tobacco: Never   Vaping Use    Vaping status: Never Used   Substance and Sexual Activity    Alcohol use: Yes     Comment: Maybe 2 drinks per week    Drug use: No    Sexual activity: Yes     Birth control/protection: Vasectomy, I.U.D.        REVIEW OF SYSTEMS:   GENERAL: feels well otherwise  SKIN: denies any unusual skin lesions  LUNGS: denies shortness of breath   CARDIOVASCULAR: denies chest pain or palpitations  GI: denies abdominal pain, N/VC/D.  Denies heartburn  : no dysuria, urinary frequency,  urgency or flank pain.  MUSCULOSKELETAL: Per HPI.  No other joints are affected  NEURO: No numbness or tingling.  No weakness. No loss of bowel or bladder control.    EXAM:   /73   Pulse 73   Temp 98 °F (36.7 °C)   Resp 16   Ht 5' 11\" (1.803 m)   Wt 190 lb (86.2 kg)   SpO2 98%   BMI 26.50 kg/m²    GENERAL: well developed, well nourished,in no apparent distress  SKIN: no rashes,no suspicious lesions  NECK: supple,no adenopathy,no bruits  LUNGS: clear to auscultation  CARDIO: RRR without murmur  GI: normoactive bs x4, no masses, HSM or tenderness  EXTREMITIES: no cyanosis, clubbing or edema  MUSCULOSKELETAL: Tenderness to palpation of left lumbosacral area, palpable muscle tightness is noted to this area. No CVA tenderness. No tenderness to cervical or thoracic area. Full active ROM of torso, has increased pain with forward bending and leftward lateral bend. No increased pain with straight leg  raise. Full active ROM of bilateral hips, no increased pain with hip flexion, adduction or abduction.   NEURO: Patellar DTR's intact and equal bilaterally.  Sensation intact.    ASSESSMENT:   Lorenza Alfaro is a 58 year old female who presents with complaints of back pain   Findings are consistent with   Encounter Diagnosis   Name Primary?    Acute bilateral low back pain without sciatica Yes         PLAN:   Comfort care as listed in patient instructions as well as Medication as below.  Requested Prescriptions     Signed Prescriptions Disp Refills    cyclobenzaprine 10 MG Oral Tab 15 tablet 0     Sig: Take 1 tablet (10 mg total) by mouth 3 (three) times daily as needed for Muscle spasms.    naproxen 500 MG Oral Tab 28 tablet 0     Sig: Take 1 tablet (500 mg total) by mouth 2 (two) times daily with meals for 14 days.     Risks, benefits, side effects of medication explained and discussed. Advised caution with muscle relaxer-may cause with sedation. Advised not to drive or drink alcohol while on muscle relaxer. Advised to take naproxen with food to avoid GI upset/bleed.    Patient Instructions   Do not drive or drink alcohol while on muscle relaxer as it may cause sedation  Take naproxen with food. Stop medication with any GI upset or signs of bleeding  Follow up with your primary care doctor next week.   Seek urgent follow up for new or worsening symtpoms.     The patient indicates understanding of these issues and agrees to the plan.  The patient is asked to return if sx's persist or worsen.

## 2024-06-10 RX ORDER — LEVOTHYROXINE SODIUM 0.03 MG/1
25 TABLET ORAL
Qty: 90 TABLET | Refills: 3 | Status: SHIPPED | OUTPATIENT
Start: 2024-06-10

## 2024-06-10 NOTE — TELEPHONE ENCOUNTER
REFILL PASSED PER [facility] PROTOCOLS    Requested Prescriptions   Pending Prescriptions Disp Refills    LEVOTHYROXINE 25 MCG Oral Tab [Pharmacy Med Name: LEVOTHYROXINE 0.025MG (25MCG) TAB] 90 tablet 0     Sig: TAKE 1 TABLET(25 MCG) BY MOUTH BEFORE BREAKFAST       Thyroid Medication Protocol Passed - 6/6/2024  5:41 AM        Passed - TSH in past 12 months        Passed - Last TSH value is normal     Lab Results   Component Value Date    TSH 2.960 07/31/2023                 Passed - In person appointment or virtual visit in the past 12 mos or appointment in next 3 mos     Recent Outpatient Visits              6 days ago Acute bilateral low back pain without sciatica    [facility], [address] [place], [place] [address]Isaura Vargas APRN    Office Visit    6 months ago Dermatophytosis of nail    [facility] Gerson Roth DPM    Office Visit    7 months ago Chronic left shoulder pain    [address]    Office Visit    10 months ago Wellness examination    [address]    Office Visit    11 months ago Cystitis with hematuria    [facility], [address] [place] [address] Devi Krishna APRN    Office Visit          Future Appointments         Provider Department Appt Notes    In 2 months Dimas Gresham MD [address] CPE-last one 8/3/23                         Future Appointments         Provider Department Appt Notes    In 2 months Dimas Gresham MD [address] CPE-last one 8/3/23          Recent Outpatient Visits              6 days ago Acute bilateral low back pain without sciatica    [facility], [address] [place], [place] [address]Isaura Vargas APRN    Office Visit    6  months ago Dermatophytosis of nail    [facility] Gerson Roth DPM    Office Visit    7 months ago Chronic left shoulder pain    [facility], [address] [place] [address] Dimas Arnold MD    Office Visit    10 months ago Wellness examination    [facility], [address] [place] [address] Dimas Arnold MD    Office Visit    11 months ago Cystitis with hematuria    [facility], [address] [place] [address], [place]    Office Visit

## 2024-07-23 ENCOUNTER — TELEMEDICINE (OUTPATIENT)
Dept: INTERNAL MEDICINE CLINIC | Facility: CLINIC | Age: 59
End: 2024-07-23
Payer: COMMERCIAL

## 2024-07-23 ENCOUNTER — HOSPITAL ENCOUNTER (OUTPATIENT)
Dept: GENERAL RADIOLOGY | Facility: HOSPITAL | Age: 59
Discharge: HOME | End: 2024-07-23
Attending: FAMILY MEDICINE
Payer: COMMERCIAL

## 2024-07-23 ENCOUNTER — TELEPHONE (OUTPATIENT)
Dept: INTERNAL MEDICINE CLINIC | Facility: CLINIC | Age: 59
End: 2024-07-23

## 2024-07-23 DIAGNOSIS — Z13.29 SCREENING FOR THYROID DISORDER: ICD-10-CM

## 2024-07-23 DIAGNOSIS — G89.29 CHRONIC BILATERAL LOW BACK PAIN WITHOUT SCIATICA: Primary | ICD-10-CM

## 2024-07-23 DIAGNOSIS — Z13.220 SCREENING FOR LIPOID DISORDERS: ICD-10-CM

## 2024-07-23 DIAGNOSIS — M54.50 CHRONIC BILATERAL LOW BACK PAIN WITHOUT SCIATICA: Primary | ICD-10-CM

## 2024-07-23 DIAGNOSIS — M54.50 CHRONIC BILATERAL LOW BACK PAIN WITHOUT SCIATICA: ICD-10-CM

## 2024-07-23 DIAGNOSIS — Z13.0 SCREENING FOR ENDOCRINE, METABOLIC AND IMMUNITY DISORDER: ICD-10-CM

## 2024-07-23 DIAGNOSIS — Z13.29 SCREENING FOR ENDOCRINE, METABOLIC AND IMMUNITY DISORDER: ICD-10-CM

## 2024-07-23 DIAGNOSIS — G89.29 CHRONIC BILATERAL LOW BACK PAIN WITHOUT SCIATICA: ICD-10-CM

## 2024-07-23 DIAGNOSIS — Z13.228 SCREENING FOR ENDOCRINE, METABOLIC AND IMMUNITY DISORDER: ICD-10-CM

## 2024-07-23 DIAGNOSIS — Z00.00 ROUTINE GENERAL MEDICAL EXAMINATION AT A HEALTH CARE FACILITY: Primary | ICD-10-CM

## 2024-07-23 DIAGNOSIS — Z13.0 SCREENING FOR BLOOD DISEASE: ICD-10-CM

## 2024-07-23 PROCEDURE — 72110 X-RAY EXAM L-2 SPINE 4/>VWS: CPT | Performed by: FAMILY MEDICINE

## 2024-07-23 PROCEDURE — 99213 OFFICE O/P EST LOW 20 MIN: CPT | Performed by: FAMILY MEDICINE

## 2024-07-23 RX ORDER — CYCLOBENZAPRINE HCL 10 MG
10 TABLET ORAL NIGHTLY PRN
Qty: 30 TABLET | Refills: 0 | Status: SHIPPED | OUTPATIENT
Start: 2024-07-23

## 2024-07-23 NOTE — TELEPHONE ENCOUNTER
Future Appointments   Date Time Provider Department Center   7/23/2024 11:30 AM [facility] XR [facility] GENERAL [facility]   8/12/2024  8:30 AM Dimas Gresham MD [facility]     Labs placed per protocol.

## 2024-07-23 NOTE — PROGRESS NOTES
The patient's office visit was converted to a video visit with informed patient consent.   Time Spent: 10 min    Subjective     HPI:   Lorenza Alfaro is a 58 year old female who presents for evaluation of low back pain. Her pain began about 6 week ago. She was seen in the  and was given muscle relaxant and Naproxen. Pain initially improved, but has returned. The symptoms may have started a few weeks after a fall on her hands and knees while hiking. She endorses the pain is bilateral lumbar spine.     REVIEW OF SYSTEMS:  GENERAL: Feels well otherwise. No radicular symptoms.     Physical Exam:  Appears well on exam.     Assessment and Plan:  Lorenza Alfaro is presenting for evaluation.     Chronic bilateral low back pain without sciatica  Will begin evaluation with lumbar radiographs and start formal physical therapy. Refill provided for PRN nightly muscle relaxant. She has follow-up scheduled in 3 weeks.   - cyclobenzaprine 10 MG Oral Tab; Take 1 tablet (10 mg total) by mouth nightly as needed for Muscle spasms.  Dispense: 30 tablet; Refill: 0  - Physical Therapy Referral - External  - XR LUMBAR SPINE (MIN 4 VIEWS) (CPT=72110); Future        Dimas Gresham MD    Lorenza Alfaro understands phone evaluation is not a substitute for face-to-face examination or emergency care. Patient advised to go to ER or call 911 for worsening symptoms or acute distress.     Please note that the following visit was completed using two-way, real-time interactive video communication. There are limitations of this visit as no physical exam could be performed.  Every conscious effort was taken to allow for sufficient and adequate time.  This billing visit was spent on reviewing labs, medications, radiology tests and decision making.  Appropriate medical decision-making and tests are ordered as detailed in the plan of care above.

## 2024-07-26 ENCOUNTER — LAB ENCOUNTER (OUTPATIENT)
Dept: LAB | Facility: HOSPITAL | Age: 59
End: 2024-07-26
Attending: PREVENTIVE MEDICINE
Payer: COMMERCIAL

## 2024-07-26 ENCOUNTER — TELEPHONE (OUTPATIENT)
Dept: INTERNAL MEDICINE CLINIC | Facility: HOSPITAL | Age: 59
End: 2024-07-26

## 2024-07-26 DIAGNOSIS — Z20.822 EXPOSURE TO CONFIRMED CASE OF COVID-19: Primary | ICD-10-CM

## 2024-07-26 DIAGNOSIS — Z20.822 EXPOSURE TO CONFIRMED CASE OF COVID-19: ICD-10-CM

## 2024-07-26 PROCEDURE — 87635 SARS-COV-2 COVID-19 AMP PRB: CPT

## 2024-07-26 NOTE — TELEPHONE ENCOUNTER
[x] EH  []TANIA   [] [facility]  Manager : Noble Delarosa    [] Direct Patient Care  [x]Indirect Patient Contact   [] Non-Clinical/No Patient Contact    High Risk Area:    [] Yes   [x] No    For Direct Patient Care ONLY: Have you been fitted with an N95 mask? [] Yes  [x]No      HAVE YOU RECEIVED THE COVID-19 Vaccine? Yes [x]    No []          If yes, date(s) received: 12/18/2020 01/08/2021 12/06/2022            Which vaccine:  Pfizer [x]     Moderna []    J&J []      SYMPTOMS (reported via dashboard):  [x] asymptomatic  [] symptomatic  [] GI symptoms only    Symptom onset date:   Fever   > 100F             Yes []      Cough                          Yes []      Shortness of breath  Yes []      Congestion                 Yes []      Runny nose                Yes []        Loss of Smell              Yes []        Loss of Taste             Yes []       Sore throat                 Yes []       Fatigue                        Yes []       Body Aches                Yes []        Chills                           Yes []        Headache                   Yes []             GI symptoms             Yes []     No []                     Nausea   []          Vomiting            []                                    Diarrhea  []          Upset stomach []      Employee reported COVID Exposure?  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 [x] Yes []  No    Is this a true exposure? [x]  Yes []  No    When was the last shift you worked?: 07/25/2024    Employee has a history of Covid?  Yes []     No [x]   If Yes, when:    PLAN:     COVID-19 testing ordered:   [] Rapid      [x] Alinity              Date test is to be taken:   07/27/2024    []  No testing required at this time  []  Outside testing                           Notes:    INSTRUCTIONS  PROVIDED:    [x]  Employee was instructed to call Central scheduling at [phone] or use [employer] to make an appointment for their testing   []  May return to work if employee views negative result in MyChart and remains fever, vomiting, and diarrhea free  [x]  May continue to work if remains asymptomatic and views negative result in MyChart  []  Follow up for condition update when resulting  []  If symptoms develop, stay home and call hotline for rapid test order  []  If COVID positive results, off work minimum of 5 days from positive test or onset of symptoms (day 0)     [x]  Plan noted above  [x]  Length of time to obtain results  []  Quarantine instructions  []  S/S of worsening infection/condition and importance of prompt medical re-evaluation including when to seek emergency care.   [x] The employee voiced understanding

## 2024-07-27 LAB — SARS-COV-2 RNA RESP QL NAA+PROBE: NOT DETECTED

## 2024-07-29 ENCOUNTER — LAB ENCOUNTER (OUTPATIENT)
Dept: LAB | Facility: HOSPITAL | Age: 59
End: 2024-07-29
Attending: PREVENTIVE MEDICINE
Payer: COMMERCIAL

## 2024-07-29 DIAGNOSIS — Z20.822 EXPOSURE TO CONFIRMED CASE OF COVID-19: ICD-10-CM

## 2024-07-29 PROCEDURE — 87635 SARS-COV-2 COVID-19 AMP PRB: CPT

## 2024-07-29 NOTE — TELEPHONE ENCOUNTER
Results and RTW guidelines:    COVID RESULT:    [x] Viewed by employee in [employer].  RTW plan and instructions as indicated on triage call.  Manager notified.  Estimated RTW date:   [] Discussed with employee   [x] Unable to reach by phone.  Sent via [employer] message      Test type:    [] Rapid         [x] Alinity         [] Outside test:       [x] NEGATIVE     Ordered Alinity retest?  [x]Yes   [] No (skip to RTW)   Ordered Rapid retest?   []Yes   [x] No (skip to RTW)           Dated to be taken:  7/29    If Yes, PLACE ORDER NOW and instruct the following:  -Originally Symptomatic or Now Symptoms:   -RTW when sx improve- fever free for 24 hours w/o medications, Diarrhea/Vomiting for 24 hours w/o medications    -Originally  Asymptomatic  -Asymptomatic AND Vaccinated or Unvaccinated or Prior infection in past 90 days:   -May work and continue to monitor symptoms for the next 10 days.                                         -Alinity test day 2, Alinity test day 5 (day 0 - exposure)      Notes:     RTW PLAN:    []  If COVID positive results, off work minimum of 5 days from positive test or onset of symptoms (day 0)        On day 5, if asymptomatic or mildly symptomatic (with improving symptoms) may return to work day 6          On day 5, if symptomatic, call Employee Health for RTW screening        []  COVID positive result - call Employee Health on day 5 after symptom onset.  The employee needs to be cleared by Employee Health to RTW.  [] RTW immediately, continue to monitor for sx  [] RTW when sx improve; must be fever free for 24 hours w/o medications, Diarrhea/Vomiting free for 24 hours w/o medications  [x] Alinity ordered; continue to monitor sx and call for new/worsening sx.  Discuss RTW guidelines with manager  [] May continue to work  [] Follow up with PCP  [] Home until further instruction from hotline with Alinity results  INSTRUCTIONS PROVIDED:  [x]  Plan as noted above  [x]  Length of time to obtain results    []  Quarantine instructions  []  Masking protocol   []  S/S of worsening infection/condition and importance of prompt medical re-evaluation including when to seek emergency care  [] If symptoms develop, stay home and call hotline for rapid test order    Estimated RTW date:      [] The employee voiced understanding of above plan/instructions  [x] Manager Notified

## 2024-07-30 LAB — SARS-COV-2 RNA RESP QL NAA+PROBE: NOT DETECTED

## 2024-07-30 NOTE — TELEPHONE ENCOUNTER
Results and RTW guidelines:    COVID RESULT:    [x] Viewed by employee in [employer].  RTW plan and instructions as indicated on triage call.  Manager notified.  Estimated RTW date: 07/29/2024  [] Discussed with employee   [] Unable to reach by phone.  Sent via Wish message      Test type:    [] Rapid         [x] Alinity         [] Outside test:       [x] NEGATIVE     Ordered Alinity retest?  []Yes   [x] No (skip to RTW)   Ordered Rapid retest?   []Yes   [x] No (skip to RTW)           Dated to be taken:      If Yes, PLACE ORDER NOW and instruct the following:  -Originally Symptomatic or Now Symptoms:   -RTW when sx improve- fever free for 24 hours w/o medications, Diarrhea/Vomiting for 24 hours w/o medications    -Originally  Asymptomatic  -Asymptomatic AND Vaccinated or Unvaccinated or Prior infection in past 90 days:   -May work and continue to monitor symptoms for the next 10 days.                                         -Alinity test day 2, Alinity test day 5 (day 0 - exposure)

## 2024-08-05 ENCOUNTER — LAB ENCOUNTER (OUTPATIENT)
Dept: LAB | Age: 59
End: 2024-08-05
Attending: FAMILY MEDICINE
Payer: COMMERCIAL

## 2024-08-05 DIAGNOSIS — Z13.29 SCREENING FOR THYROID DISORDER: ICD-10-CM

## 2024-08-05 DIAGNOSIS — Z13.220 SCREENING FOR LIPOID DISORDERS: ICD-10-CM

## 2024-08-05 DIAGNOSIS — Z13.29 SCREENING FOR ENDOCRINE, METABOLIC AND IMMUNITY DISORDER: ICD-10-CM

## 2024-08-05 DIAGNOSIS — Z13.0 SCREENING FOR BLOOD DISEASE: ICD-10-CM

## 2024-08-05 DIAGNOSIS — Z13.228 SCREENING FOR ENDOCRINE, METABOLIC AND IMMUNITY DISORDER: ICD-10-CM

## 2024-08-05 DIAGNOSIS — Z00.00 ROUTINE GENERAL MEDICAL EXAMINATION AT A HEALTH CARE FACILITY: ICD-10-CM

## 2024-08-05 DIAGNOSIS — Z13.0 SCREENING FOR ENDOCRINE, METABOLIC AND IMMUNITY DISORDER: ICD-10-CM

## 2024-08-05 LAB
ALBUMIN SERPL-MCNC: 4.9 G/DL (ref 3.2–4.8)
ALBUMIN/GLOB SERPL: 1.9 {RATIO} (ref 1–2)
ALP LIVER SERPL-CCNC: 74 U/L
ALT SERPL-CCNC: 25 U/L
ANION GAP SERPL CALC-SCNC: 3 MMOL/L (ref 0–18)
AST SERPL-CCNC: 27 U/L (ref ?–34)
BASOPHILS # BLD AUTO: 0.03 X10(3) UL (ref 0–0.2)
BASOPHILS NFR BLD AUTO: 0.6 %
BILIRUB SERPL-MCNC: 0.8 MG/DL (ref 0.3–1.2)
BUN BLD-MCNC: 22 MG/DL (ref 9–23)
CALCIUM BLD-MCNC: 10 MG/DL (ref 8.7–10.4)
CHLORIDE SERPL-SCNC: 107 MMOL/L (ref 98–112)
CHOLEST SERPL-MCNC: 219 MG/DL (ref ?–200)
CO2 SERPL-SCNC: 28 MMOL/L (ref 21–32)
CREAT BLD-MCNC: 1.01 MG/DL
EGFRCR SERPLBLD CKD-EPI 2021: 65 ML/MIN/1.73M2 (ref 60–?)
EOSINOPHIL # BLD AUTO: 0.13 X10(3) UL (ref 0–0.7)
EOSINOPHIL NFR BLD AUTO: 2.7 %
ERYTHROCYTE [DISTWIDTH] IN BLOOD BY AUTOMATED COUNT: 11.9 %
FASTING PATIENT LIPID ANSWER: YES
FASTING STATUS PATIENT QL REPORTED: YES
GLOBULIN PLAS-MCNC: 2.6 G/DL (ref 2–3.5)
GLUCOSE BLD-MCNC: 92 MG/DL (ref 70–99)
HCT VFR BLD AUTO: 37.8 %
HDLC SERPL-MCNC: 59 MG/DL (ref 40–59)
HGB BLD-MCNC: 13 G/DL
IMM GRANULOCYTES # BLD AUTO: 0.01 X10(3) UL (ref 0–1)
IMM GRANULOCYTES NFR BLD: 0.2 %
LDLC SERPL CALC-MCNC: 150 MG/DL (ref ?–100)
LYMPHOCYTES # BLD AUTO: 1.79 X10(3) UL (ref 1–4)
LYMPHOCYTES NFR BLD AUTO: 37.5 %
MCH RBC QN AUTO: 29.5 PG (ref 26–34)
MCHC RBC AUTO-ENTMCNC: 34.4 G/DL (ref 31–37)
MCV RBC AUTO: 85.7 FL
MONOCYTES # BLD AUTO: 0.42 X10(3) UL (ref 0.1–1)
MONOCYTES NFR BLD AUTO: 8.8 %
NEUTROPHILS # BLD AUTO: 2.39 X10 (3) UL (ref 1.5–7.7)
NEUTROPHILS # BLD AUTO: 2.39 X10(3) UL (ref 1.5–7.7)
NEUTROPHILS NFR BLD AUTO: 50.2 %
NONHDLC SERPL-MCNC: 160 MG/DL (ref ?–130)
OSMOLALITY SERPL CALC.SUM OF ELEC: 289 MOSM/KG (ref 275–295)
PLATELET # BLD AUTO: 291 10(3)UL (ref 150–450)
POTASSIUM SERPL-SCNC: 3.9 MMOL/L (ref 3.5–5.1)
PROT SERPL-MCNC: 7.5 G/DL (ref 5.7–8.2)
RBC # BLD AUTO: 4.41 X10(6)UL
SODIUM SERPL-SCNC: 138 MMOL/L (ref 136–145)
TRIGL SERPL-MCNC: 56 MG/DL (ref 30–149)
TSI SER-ACNC: 3.35 MIU/ML (ref 0.55–4.78)
VLDLC SERPL CALC-MCNC: 11 MG/DL (ref 0–30)
WBC # BLD AUTO: 4.8 X10(3) UL (ref 4–11)

## 2024-08-05 PROCEDURE — 80061 LIPID PANEL: CPT | Performed by: FAMILY MEDICINE

## 2024-08-05 PROCEDURE — 80050 GENERAL HEALTH PANEL: CPT | Performed by: FAMILY MEDICINE

## 2024-08-12 ENCOUNTER — OFFICE VISIT (OUTPATIENT)
Dept: INTERNAL MEDICINE CLINIC | Facility: CLINIC | Age: 59
End: 2024-08-12
Payer: COMMERCIAL

## 2024-08-12 VITALS
RESPIRATION RATE: 18 BRPM | BODY MASS INDEX: 26.98 KG/M2 | DIASTOLIC BLOOD PRESSURE: 80 MMHG | HEART RATE: 77 BPM | WEIGHT: 197 LBS | OXYGEN SATURATION: 99 % | TEMPERATURE: 98 F | HEIGHT: 71.46 IN | SYSTOLIC BLOOD PRESSURE: 120 MMHG

## 2024-08-12 DIAGNOSIS — M54.50 CHRONIC BILATERAL LOW BACK PAIN WITHOUT SCIATICA: ICD-10-CM

## 2024-08-12 DIAGNOSIS — E78.00 HYPERCHOLESTEREMIA: ICD-10-CM

## 2024-08-12 DIAGNOSIS — Z00.00 WELLNESS EXAMINATION: Primary | ICD-10-CM

## 2024-08-12 DIAGNOSIS — G89.29 CHRONIC BILATERAL LOW BACK PAIN WITHOUT SCIATICA: ICD-10-CM

## 2024-08-12 DIAGNOSIS — Z86.010 HISTORY OF ADENOMATOUS POLYP OF COLON: ICD-10-CM

## 2024-08-12 DIAGNOSIS — E03.9 ACQUIRED HYPOTHYROIDISM: ICD-10-CM

## 2024-08-12 RX ORDER — NAPROXEN SODIUM 220 MG
TABLET ORAL
COMMUNITY
Start: 2024-07-01

## 2024-08-12 NOTE — PROGRESS NOTES
Lorenza Alfaro  11/13/1965    Chief Complaint   Patient presents with    Physical     Rm 9, VS       HPI:   Lorenza Alfaro is a 58 year old female who presents for CPE. She has started PT for her back, only has done 2 sessions. She has continued to struggle with weight loss. She is trying to eat healthy and exercising regularly but still struggling to loose weight.      Current Outpatient Medications   Medication Sig Dispense Refill    naproxen 220 MG Oral Tab       cyclobenzaprine 10 MG Oral Tab Take 1 tablet (10 mg total) by mouth nightly as needed for Muscle spasms. 30 tablet 0    levothyroxine 25 MCG Oral Tab Take 1 tablet (25 mcg total) by mouth before breakfast. 90 tablet 3    polyethylene glycol, PEG 3350, 17 g Oral Powd Pack Take 17 g by mouth daily.      meclizine 12.5 MG Oral Tab Take 1 tablet (12.5 mg total) by mouth 3 (three) times daily as needed. 10 tablet 0    loratadine 10 MG Oral Tab Take 1 tablet (10 mg total) by mouth daily.      Psyllium (METAMUCIL FIBER OR) Take by mouth 2 (two) times daily.      Multiple Vitamins-Minerals (MULTI-VITAMIN/MINERALS) Oral Tab Take 1 tablet by mouth daily.      Calcium Carbonate-Vitamin D 600-400 MG-UNIT Oral Tab Take 1 tablet by mouth daily.      Melatonin 5 MG Oral Tab Take 1 tablet (5 mg total) by mouth nightly. Extra 3mg if needed      docusate sodium 100 MG Oral Cap Take 1 capsule (100 mg total) by mouth 2 (two) times daily.      Acetaminophen (TYLENOL OR) prn        No Known Allergies   Past Medical History:    Constipation    Detached retina, right    Exposure to TB    At work    Frequent use of laxatives    To become more regular    Heartburn    Heavy menses    Hemorrhoids    HOSPITALIZATIONS    viral meningtitis    Irregular bowel habits    Menses painful    Migraines    Nausea    At work. 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    Visual impairment    Wears glasses    Weight gain    Weight loss    Intentional      Patient Active Problem List   Diagnosis    High myopia, bilateral    Retinal horseshoe tear without detachment    Sleep-disordered breathing    Adhesive capsulitis of left shoulder    Sprain of left rotator cuff capsule, initial encounter    Chronic left shoulder pain    Abnormal auditory perception, unspecified laterality    BPPV (benign paroxysmal positional vertigo), right    History of adenomatous polyp of colon    Acquired hypothyroidism      Past Surgical History:   Procedure Laterality Date    Colonoscopy      D & c      Dilation/curettage,diagnostic        Family History   Problem Relation Age of Onset    Heart Disorder Father 72        MI x 2, first at 72    Lipids Father     Hypertension Father     Heart Attack Father     Other (Other) Maternal Grandmother         dementia - not alzheimers    Cancer Maternal Grandfather          of leukemia    Stroke Maternal Grandfather     Cancer Paternal Grandmother         breast ca    Breast Cancer Paternal Grandmother 60    Heart Disorder Paternal Grandfather          of multiple strokes      Social History     Socioeconomic History    Marital status:    Tobacco Use    Smoking status: Never    Smokeless tobacco: Never   Vaping Use    Vaping status: Never Used   Substance and Sexual Activity    Alcohol use: Yes     Comment: Maybe 2 drinks per week    Drug use: No    Sexual activity: Yes     Birth control/protection: Vasectomy, I.U.D.         REVIEW OF SYSTEMS:   GENERAL: feels well otherwise  SKIN: no rash  EYES: no new vision changes  HEENT: not congested  LUNGS: no new dyspnea  CARDIOVASCULAR: no new chest pain  GI: no new abdominal pain  NEURO: no headaches    EXAM:   /80   Pulse 77   Temp 97.7 °F (36.5 °C)   Resp 18   Ht 5' 11.46\" (1.815 m)   Wt 197 lb (89.4 kg)   SpO2 99%   BMI 27.13 kg/m²   GENERAL: Well  developed, well nourished,in no apparent distress  SKIN: No rash  EYES: PERRLA, EOMI, conjunctiva are clear  HEENT: atraumatic, normocephalic  LUNGS: clear to auscultation  CARDIO: RRR without murmur  GI: good BS's,no masses, HSM or tenderness  MSK: reproducible pain with facet loading. No neural tension    ASSESSMENT AND PLAN:   Lorenza Alfaro is a 58 year old female who presents for CPE    Wellness examination  Discussed age appropriate health and wellness.   - Tdap (Adacel, Boostrix) [40345]    Acquired hypothyroidism  TSH borderline high. Struggling with weight loss. Will increase levothyroxine to 37.5 mcg daily. Repeat TSH in 6 weeks.   - Levothyroxine Sodium 37.5 MCG Oral Cap; Take 1 capsule by mouth daily.  Dispense: 90 capsule; Refill: 0  - TSH W Reflex To Free T4 [E]; Future    History of adenomatous polyp of colon  Colonoscopy UTD, recall next summer.     Hypercholesteremia  Low ASCVD risk, previous calcium score 0. Encouraged continued emphasis on healthy diet and exercise. Will monitory yearly.     Chronic bilateral low back pain without sciatica  Continue PT. Discussed additional analgesic options.       All questions were answered and the patient agrees with the plan.     Thank you,  Dimas Gresham MD

## 2024-10-28 ENCOUNTER — LAB ENCOUNTER (OUTPATIENT)
Dept: LAB | Age: 59
End: 2024-10-28
Attending: FAMILY MEDICINE
Payer: COMMERCIAL

## 2024-10-28 DIAGNOSIS — E03.9 ACQUIRED HYPOTHYROIDISM: ICD-10-CM

## 2024-10-28 LAB — TSI SER-ACNC: 4.59 MIU/ML (ref 0.55–4.78)

## 2024-10-28 PROCEDURE — 84443 ASSAY THYROID STIM HORMONE: CPT | Performed by: FAMILY MEDICINE

## 2024-10-29 DIAGNOSIS — E03.9 ACQUIRED HYPOTHYROIDISM: Primary | ICD-10-CM

## 2024-10-29 RX ORDER — LEVOTHYROXINE SODIUM 50 UG/1
50 TABLET ORAL
Qty: 90 TABLET | Refills: 0 | Status: SHIPPED | OUTPATIENT
Start: 2024-10-29

## 2024-12-02 ENCOUNTER — LAB ENCOUNTER (OUTPATIENT)
Dept: LAB | Age: 59
End: 2024-12-02
Attending: FAMILY MEDICINE
Payer: COMMERCIAL

## 2024-12-02 DIAGNOSIS — E03.9 ACQUIRED HYPOTHYROIDISM: ICD-10-CM

## 2024-12-02 LAB — TSI SER-ACNC: 2.81 UIU/ML (ref 0.55–4.78)

## 2024-12-02 PROCEDURE — 84443 ASSAY THYROID STIM HORMONE: CPT | Performed by: FAMILY MEDICINE

## 2025-01-26 DIAGNOSIS — E03.9 ACQUIRED HYPOTHYROIDISM: ICD-10-CM

## 2025-01-30 RX ORDER — LEVOTHYROXINE SODIUM 50 UG/1
50 TABLET ORAL
Qty: 90 TABLET | Refills: 3 | Status: SHIPPED | OUTPATIENT
Start: 2025-01-30

## 2025-03-10 ENCOUNTER — TELEPHONE (OUTPATIENT)
Dept: INTERNAL MEDICINE CLINIC | Facility: CLINIC | Age: 60
End: 2025-03-10

## 2025-03-10 NOTE — TELEPHONE ENCOUNTER
Patient LVM she was trying to enter insurance information in my chart as requested.  Please call her back.   Has NP appt    Future Appointments   Date Time Provider Department Center   3/24/2025  1:00 PM Mireya Perry APRN [facility]

## 2025-03-21 ENCOUNTER — TELEPHONE (OUTPATIENT)
Dept: INTERNAL MEDICINE CLINIC | Facility: CLINIC | Age: 60
End: 2025-03-21

## 2025-03-21 NOTE — TELEPHONE ENCOUNTER
Patient LVM she has appt Monday with [facility]  She is having episodes of what she thinks is sleep apnea.  She is asking if this is something [facility] can order a sleep study for or if she must see PCP.    Future Appointments   Date Time Provider Department Center   3/24/2025  1:00 PM Mireya Perry APRN [facility]     I called patient to let her know we order sleep studies all the time.  She can talk to [facility] on Monday

## 2025-03-24 ENCOUNTER — OFFICE VISIT (OUTPATIENT)
Dept: INTERNAL MEDICINE CLINIC | Facility: CLINIC | Age: 60
End: 2025-03-24
Payer: COMMERCIAL

## 2025-03-24 VITALS
WEIGHT: 203 LBS | OXYGEN SATURATION: 96 % | RESPIRATION RATE: 18 BRPM | BODY MASS INDEX: 29.06 KG/M2 | HEIGHT: 70 IN | SYSTOLIC BLOOD PRESSURE: 118 MMHG | DIASTOLIC BLOOD PRESSURE: 74 MMHG | HEART RATE: 92 BPM

## 2025-03-24 DIAGNOSIS — R06.83 SNORING: ICD-10-CM

## 2025-03-24 DIAGNOSIS — E03.9 ACQUIRED HYPOTHYROIDISM: ICD-10-CM

## 2025-03-24 DIAGNOSIS — E78.00 HYPERCHOLESTEREMIA: ICD-10-CM

## 2025-03-24 DIAGNOSIS — E66.3 OVERWEIGHT (BMI 25.0-29.9): ICD-10-CM

## 2025-03-24 DIAGNOSIS — Z51.81 THERAPEUTIC DRUG MONITORING: Primary | ICD-10-CM

## 2025-03-24 RX ORDER — NALTREXONE HYDROCHLORIDE 50 MG/1
25 TABLET, FILM COATED ORAL DAILY
Qty: 15 TABLET | Refills: 3 | Status: SHIPPED | OUTPATIENT
Start: 2025-03-24

## 2025-03-24 RX ORDER — BUPROPION HYDROCHLORIDE 150 MG/1
150 TABLET ORAL DAILY
Qty: 30 TABLET | Refills: 3 | Status: SHIPPED | OUTPATIENT
Start: 2025-03-24

## 2025-03-24 NOTE — PATIENT INSTRUCTIONS
Welcome to the [facility]!!  Thank you for placing your trust in our health care team, I look forward to working with you along this journey to better health!  Next steps:     1.  Schedule a personal nutrition consultation with one of our registered dieticians. Bring along your food journal (3 days minimum). See journal options below.  2.  Fill your prescribed medication and take as discussed and prescribed: generic contrave (wellbutrin + naltrexone)  3. Get blood work done  4. Orders for at home sleep study      Please try to work on the following dietary changes this first month:  Daily protein recommendation to start:  grams  Daily carbohydrate: <120g  Daily calories: 1,400  1.  Drink water with meals and throughout the day, cut down on soda and/or juice if consumed. Consider flavored water options like Bubbly, Spindrift, Hint and Pb.  2.  Eat breakfast daily and focus on having protein with each meal, examples include: greek yogurt, cottage cheese, hard boiled egg, whole grain toast with peanut butter.   3.  Reduce refined carbohydrates and sugars which includes items such as sweets, as well as rice, pasta, and bread and make sure to choose whole grain options when having them with just 1 serving per meal about the size of your inner palm.  4.  Consume non starchy veggies daily working towards making them a good 50% of your daily food intake. Add them to lunch and dinner consistently.  5.  Optional can start a daily probiotic: VSL#3, (order on line at www.vsl3.com). Take 1 capsule daily with water for 30 days, then reduce to 1 every other day (this will reduce the cost). Capsules can be left out for 2 weeks, but then must be refrigerated.      Please download rachel My Fitness Pal, LoseIt! Or Net Diary to monitor daily dietary intake and you will be able to see if you are eating the right amount of calories or too much or too little which would hinder weight loss.  Additionally this will help to see your daily carbohydrate and protein intake. When you set the juliana up choose 1-2 lbs/week as a goal.  Keeping a paper food journal is an option as well to remain accountable for your choices- this is the start to mindful eating! A low calorie diet has been consistently shown to support weight loss.     Continue or start exercising to help establish a routine. If not already exercising begin with 1 day and progress as able with long-term goal of 30 minutes 5 days a week at a minimum.     Meditation daily can help manage and control stress. Chronic stress can make weight loss difficult.  Exercising is one way to help with stress, but meditation using the CALM Juliana or another comparable alternative can be done in your home or place of work with little time commitment. This Juliana can also help work on behavior change and improve sleep. Check out the segment under Calm Masterclass and listen to The 4 Pillars of Health. A great way to begin learning about the foundation of lifestyle with practical tips to use in your every day.     Check out www.yourweightmatters.org blog for continued daily support and education along this weight loss journey!    Patient Resources:     Personal Training/Fitness Classes/Health Coaching     [facility] @ [email or web address] Full fitness center with group fitness and personal training. Discount available as client of [facility].  Health Coaching and Personal Training with Angely Vela at our [facility]- individual weekly coaching with option to add personal training and small group fitness classes targeted at weight loss- [phone] and/or email @ [email or web address]  360[employer]IT [place] [email or web address]. Group Fitness [phone] and/or email Isaura at [email or web address]  [facility] Fitness Centers with multiple locations: [place] Theory  Fitness (www.[email or web address]), Eat [employer] Fitness (www.WikiBrains.Tenrox), [employer] Fitness (www.TareasPlus), The Exercise  (www.exercisecoach.Tenrox)     Online Fitness  Fitness  on Utube  Fit in 10 DVD series- [email or web address]  Sit and Be Fit - Chair exercise series Www.sitandbefit.org  Hip Hop Fit with Oneil Polanco at www.hiphopfit.net     Apps for on the Go Fitness  [place] 7 Minute Workout (orange box with white 7) - free on the go HIIT training juliana  Peloton Juliana @ www.[email or web address]     Nutrition Trackers and Tools  LoseIT! And My Fitness Pal apps and on line for tracking nutrition  NOOM - virtual health coaching  FitFoundation (healthy meals on the go) in [place] @ [email or web address]  Bistro MD @ [email or web address].com and Irsygi02 (keto and low carb plans recommended) @ www.[email or web address], Metabolic Meals @ www.DiaphonicsMetabolicMeals.Tenrox - individual prepared meals to go  Gobble, Blue Apron, Home , Every Plate, Sunbasket- on line meal delivery programs for preparation at home  Meal Village in [place] for homemade meals to go @ www.[email or web address]  Diet Doctor @ www.dietdoctor.Tenrox - low carb swaps  StepOne Health - meal prep and planning juliana (www.yummly.com)     Stress Management/Behavior/Mindful Eating  CALM meditation juliana (www.calm.com)  Headspace  Am I Hungry? Mindful eating virtual  juliana  Www.[email or web address] - Obesity Action Coalition sponsored Blog posts daily  Motivation juliana (black box with white \")- daily supportive messages sent to your phone     Books/Video Education/Podcasts  Mindless Eating by Aron Vail  Why We Get Sick by Gerardo Hardy (a book about insulin resistance)  Atomic Habits by Ken Fernando (a book about taking small steps to promote greater behavior change)   Can't Hurt Me by Rosendo Richard (a book exploring the power of discipline in achieving your goals)  The End of Dieting: How to Live for Life by Dr. Jovani Boyd M.D. or listen to [employer]  Podcast Episode 63: Understanding \"Nutritarian\" Eating w/Dr. Jovani Boyd  Your Body in Balance: The New Science of Food, Hormones, and Health by Dr. Stevan Andrews  The Menopause Diet Plan by Maty Gonzalez and Claritza Walsh  The Complete Guide to fasting by Dr. Lujan  Sugar, Salt & Fat by Lara Mason, Ph.D, R.D.  Weight Loss Surgery Will Not Treat Food Addiction by Carina Arriaga Ph.D  The Game Changers- SlideMail Documentary on plant based nutrition  Fed Up - documentary about obesity (Free on Utube)  The Truth About Sugar - documentary on sugar (Free on [place]a, https://youtu.be/6I4httbBQ6y)  The Dr. Torres T5 Wellness Plan by Dr. Natanael Torres MD  Fitlosophy Fitspiration - journal to better health (found at [employer] in fitness aisle)  What Happened to You?- a look at the impact trauma has on behavior written by Lis Long and Dr. Roberto Jeter  Whole Again by oRn Marte - discovering your true self after trauma  Alin Cabrera talk on SlideMail, The Call to Courage  Podcasts: The Exam Room by the Physician's Committee, Nutrition Facts by Dr. Schwartz    We are here to support you with weight loss, but please remember that you still need your primary care provider for your routine health maintenance.

## 2025-03-24 NOTE — PROGRESS NOTES
HISTORY OF PRESENT ILLNESS  Chief Complaint   Patient presents with    Weight Problem     Referred by pcp ,never tried weight loss medication ,open to medication ,not  open to surgery     Lorenza Alfaro is a 59 year old female new to our office today for initiation of medical weight loss program.  Patient presents today with c/o excess weight. Referred by, PCP    Has been struggling weight gain since menopause   Admits to constantly thinking about food all day   +snacking during the day (ie. Fruit, cookies/candy- at home)   Stress and binge eating  Is worried about snoring, both  and daughter have mentioned that its gotten worse in the past 2 months ideally would like a at home sleep study ordered    Denies chest pain, shortness of breath, dizziness, blurred vision, headache, paresthesia, nausea/vomiting.     Reason/goal for weight loss: Excercise more, eat healthier and lose 15 pounds in 6 months and Stay active. Continue to eat healthy and lose an additional 10 pounds in 1 year   Triggers for weight gain? Stress and snacking   Previous weight loss programs? NO  Previous weight loss medications?  None     Reviewed [facility] patient contract: Readiness for Lifestyle change: 8/10, Interest in Medication: 10/10, Bariatric surgery interest: 0/10    Weight  Starting weight: 203  Max weight: 208  Lowest weight: 180    Wt Readings from Last 6 Encounters:   03/24/25 203 lb (92.1 kg)   08/12/24 197 lb (89.4 kg)   06/04/24 190 lb (86.2 kg)   10/20/23 192 lb 6.4 oz (87.3 kg)   08/03/23 188 lb 9.6 oz (85.5 kg)   07/11/23 183 lb (83 kg)        Typical diet   Breakfast Lunch Dinner Snacks Fluids     5 minutes to prepare 1/2 cup vanilla greek yogurt with fresh strawberries and blueberries    2 minutes prep for warming 1  1/2 cups of leftover homemade beef stew.    Went out to eat-no prep. Beef [place], tsp of mashed potatoes, a carrot. 1/4 cup spaghetti with a cube of lobster and a large shrimp      Bone marrow on toast with  onion and butter no prep. Bite of cheesecake with blueberries. 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 Used to do 200 reps on rowing machine andwalk on inclined treadmill   Stress level: 8/10  Sleep hours and integrity: 7 Hours per night    MEDICAL HISTORY  PMH reviewed:   Cardiac disorders:negative   Depression/anxiety: anxiety- controlled   Glaucoma: negative  Kidney stones: negative  Eating disorder: negative  Migraines/seizures: negative  Joint-related conditions: back pain (was in PT for 3 month), bilat. Knee pain (up and down stairs), right ankle pain  Liver disease: negative  Thyroid disease: hypothyroid   Constipation: +  Diabetes: negative  Sleep Apnea hx: snoring, had sleep study done about 5 yrs ago (was negative at that time)   Cancer hx: negative  DVT: negative  Family or personal history of Pancreatic issues / Medullary Thyroid Cancer: negative  History of bariatric surgery: negative    FMH reviewed: obesity in parent/s or sibling: mother     REVIEW OF SYSTEMS  GENERAL: feels well otherwise,+fatigue  SKIN: denies any rashes to skin folds  HEENT: snoring- yes; denies neck thickening  LUNGS: denies shortness of breath with exertion, no apnea  CARDIOVASCULAR: denies chest pain on exertion, denies palpitations or pedal edema  GI: denies abdominal pain, distention, No N/V/D/C  MUSCULOSKELETAL: +back pain, joint pains (bilat. Knee pain)   NEURO: denies  headaches or dizziness  ENDOCRINE: denies any excess hunger, urination or thirst, denies any purple striae  PSYCH: denies change in behavior or mood, hx of anxiety     EXAM  /74   Pulse 92   Resp 18   Ht 5' 10\" (1.778 m)   Wt 203 lb (92.1 kg)   SpO2 96%   BMI 29.13 kg/m² , Percent body fat: F >32% Female 38.7%;  visceral fat 10 Muscle Mass: 31.7 lbs%       GENERAL: well developed, well nourished, in no apparent distress  SKIN: warm, pink, dry without rashes to exposed area   EYES: conjunctiva pink, sclera non icteric, PERRLA  HEENT: atraumatic, normocephalic, O/p: Mallampati score- 2  NECK: supple, non tender, no adenopathy, no thyromegaly  LUNGS: CTA in all fields, breathing non labored  CARDIO: RRR without murmur, normal S1 and S2 without clicks or gallops, no pedal edema  GI: +BS, soft, no masses, HSM or tenderness  EXTREMITIES: grossly intact  NEURO: Oriented times three, full ROM of bilateral UE/LE  PSYCH: pleasant, cooperative, normal mood and affect, no si/hi    Lab Results   Component Value Date    GLU 92 08/05/2024    BUN 22 08/05/2024    BUNCREA 17.4 10/05/2022    CREATSERUM 1.01 08/05/2024    ANIONGAP 3 08/05/2024    GFR 68 10/26/2017    GFRNAA 62 10/01/2020    GFRAA 71 10/01/2020    CA 10.0 08/05/2024    OSMOCALC 289 08/05/2024    ALKPHO 74 08/05/2024    AST 27 08/05/2024    ALT 25 08/05/2024    BILT 0.8 08/05/2024    TP 7.5 08/05/2024    ALB 4.9 (H) 08/05/2024    GLOBULIN 2.6 08/05/2024     08/05/2024    K 3.9 08/05/2024     08/05/2024    CO2 28.0 08/05/2024     No results found for: \"EAG\", \"A1C\"  Lab Results   Component Value Date    CHOLEST 219 (H) 08/05/2024    TRIG 56 08/05/2024    HDL 59 08/05/2024     (H) 08/05/2024    VLDL 11 08/05/2024    TCHDLRATIO 3.46 10/26/2017    NONHDLC 160 (H) 08/05/2024    CHOLHDLRATIO 3 02/23/2023     No results found for: \"B12\", \"VITB12\"  No results found for: \"VITD\", \"QVITD\", \"CNGY82OQ\"    Medications Ordered Prior to  Encounter[1]    ASSESSMENT/PLAN    ICD-10-CM    1. Therapeutic drug monitoring  Z51.81 buPROPion  MG Oral Tablet 24 Hr     naltrexone 50 MG Oral Tab     Vitamin D     Vitamin B12     Hemoglobin A1C     Home Sleep Apnea Test (Adult pt only) - Sleep consult required for Medicare pts     General sleep study     OP REFERRAL TO DIETITIAN [facility] (WLC USE ONLY)      2. Overweight (BMI 25.0-29.9)  E66.3 buPROPion  MG Oral Tablet 24 Hr     naltrexone 50 MG Oral Tab     Vitamin D     Vitamin B12     Hemoglobin A1C     Home Sleep Apnea Test (Adult pt only) - Sleep consult required for Medicare pts     General sleep study     OP REFERRAL TO DIETITIAN [facility] WLC (WLC USE ONLY)      3. Acquired hypothyroidism  E03.9 Home Sleep Apnea Test (Adult pt only) - Sleep consult required for Medicare pts     General sleep study     OP REFERRAL TO DIETITIAN [facility] WLC (WLC USE ONLY)      4. Hypercholesteremia  E78.00 Home Sleep Apnea Test (Adult pt only) - Sleep consult required for Medicare pts     General sleep study     OP REFERRAL TO DIETITIAN [facility] WLC (WLC USE ONLY)      5. Snoring  R06.83 Home Sleep Apnea Test (Adult pt only) - Sleep consult required for Medicare pts     General sleep study     OP REFERRAL TO DIETITIAN [facility] WLC (WLC USE ONLY)        Initial Weight Data and Goal Weight Loss:  Weight Calculations  Initial Weight: 203 lbs  Initial Weight Date: 03/24/25  Today's Weight: 203 lbs  5% Goal: 10.15  10% Goal: 20.3  Total Weight Loss: 0 lbs  Initial consult:  203 lbs on 3/2025, Down  Lb:  lbs total     PLAN   Will start medications: generic contrave (wellbutrin + naltrexone)   --advised of side effects and adverse effects of this medication  Contradictions: none  Body composition test results given   Reviewed labs, baseline labs ordered- A1c, vitamin d and vitamin b12  HLD  uncontrolled, elevated total cholesterol and LDL) follows with PCP   Snoring, for at home sleep study  Joint pain, can think about doing aqua  therapy  Hypothyroid- stable, continue with current medication regimen, managed by PCP    Decrease carbs, increase protein, no skipping meals   Needs to incorporate exercise regimen FITTE: ACSM recommendations (150-300 minutes/ week in active weight loss)   Follow up with dietitian and psychologist as recommended.  Discussed the role of sleep and stress in weight management.  Counseled on comprehensive weight loss plan including attention to nutrition, exercise and behavior/stress management for success. See patient instruction below for more details.    Total time spent on chart review, pre-charting, obtaining history, counseling, and educating, reviewing labs was 50 minutes.       NOTE TO PATIENT: The 21st Century Cures Act makes clinical notes like these available to patients in the interest of transparency. Clinical notes are medical documents used by physicians and care providers to communicate with each other. These documents include medical language and terminology, abbreviations, and treatment information that may sound technical and at times possibly unfamiliar. In addition, at times, the verbiage may appear blunt or direct. These documents are one tool providers use to communicate relevant information and clinical opinions of the care providers in a way that allows common understanding of the clinical context.     Weight Loss Contract reviewed and signed today 3/24/2025    Patient Instructions   Welcome to the [facility]!!  Thank you for placing your trust in our health care team, I look forward to working with you along this journey to better health!  Next steps:     1.  Schedule a personal nutrition consultation with one of our registered dieticians. Bring along your food journal (3 days minimum). See journal options below.  2.  Fill your prescribed medication and take as discussed and prescribed: generic contrave (wellbutrin + naltrexone)  3. Get blood work done  4. Orders  for at home sleep study      Please try to work on the following dietary changes this first month:  Daily protein recommendation to start:  grams  Daily carbohydrate: <120g  Daily calories: 1,400  1.  Drink water with meals and throughout the day, cut down on soda and/or juice if consumed. Consider flavored water options like Bubbly, Spindrift, Hint and Pb.  2.  Eat breakfast daily and focus on having protein with each meal, examples include: greek yogurt, cottage cheese, hard boiled egg, whole grain toast with peanut butter.   3.  Reduce refined carbohydrates and sugars which includes items such as sweets, as well as rice, pasta, and bread and make sure to choose whole grain options when having them with just 1 serving per meal about the size of your inner palm.  4.  Consume non starchy veggies daily working towards making them a good 50% of your daily food intake. Add them to lunch and dinner consistently.  5.  Optional can start a daily probiotic: VSL#3, (order on line at www.vsl3.com). Take 1 capsule daily with water for 30 days, then reduce to 1 every other day (this will reduce the cost). Capsules can be left out for 2 weeks, but then must be refrigerated.      Please download rachel My Fitness Pal, LoseIt! Or Net Diary to monitor daily dietary intake and you will be able to see if you are eating the right amount of calories or too much or too little which would hinder weight loss. Additionally this will help to see your daily carbohydrate and protein intake. When you set the rachel up choose 1-2 lbs/week as a goal.  Keeping a paper food journal is an option as well to remain accountable for your choices- this is the start to mindful eating! A low calorie diet has been consistently shown to support weight loss.     Continue or start exercising to help establish a routine. If not already exercising begin with 1 day and progress as able with long-term goal of 30 minutes 5 days a week at a minimum.      Meditation daily can help manage and control stress. Chronic stress can make weight loss difficult.  Exercising is one way to help with stress, but meditation using the CALM Juliana or another comparable alternative can be done in your home or place of work with little time commitment. This Juliana can also help work on behavior change and improve sleep. Check out the segment under Calm Masterclass and listen to The 4 Pillars of Health. A great way to begin learning about the foundation of lifestyle with practical tips to use in your every day.     Check out www.[email or web address] blog for continued daily support and education along this weight loss journey!    Patient Resources:     Personal Training/Fitness Classes/Health Coaching     [facility] @ [email or web address] Full fitness center with group fitness and personal training. Discount available as client of [facility].  Health Coaching and Personal Training with Angely Vela at our [facility]- individual weekly coaching with option to add personal training and small group fitness classes targeted at weight loss- [phone] and/or email @ [email or web address]  360[employer] [place] [email or web address]. Group Fitness [phone] and/or email Isaura at [email or web address]  [facility] Fitness Centers with multiple locations: Fusemachines (www.epacube), Visuu The Rambus Fitness (www.Continuity Control.Smart Balloon), Vizimax (www.Silverback Media.Smart Balloon), The Exercise  (www.exercisecoach.Smart Balloon)     Online Fitness  Fitness  on Utube  Fit in 10 DVD series- www.pfuhf12YMZ.com  Sit and Be Fit - Chair exercise series Www.sitandbefit.org  Hip Hop Fit with Oneil Polanco at www.hiphopfit.net     Apps for on the Go Fitness  Superior 7 Minute Workout (orange box with white 7) - free on the go HIIT training juliana  Stephanie Juliana @ www.onepeloton.com      Nutrition Trackers and Tools  LoseIT! And My Fitness Pal apps and on line for tracking nutrition  NOOM - virtual health coaching  FitFoundation (healthy meals on the go) in [place] @ www.[email or web address]  Kenia CACERES @ www.bistriRx Reminderd.com and Qqoqhf06 (keto and low carb plans recommended) @ www.dezqza92.com, Metabolic Meals @ www.Refinery29MetabolicMeals.com - individual prepared meals to go  Gobble, Blue Apron, Home , Every Plate, Sunbasket- on line meal delivery programs for preparation at home  Meal Village in [place] for homemade meals to go @ www.mealNetDocumentsllage.BBOXX  Diet Doctor @ www.dietdoctor.com - low carb swaps  Yummly - meal prep and planning rachel (www.yummly.com)     Stress Management/Behavior/Mindful Eating  CALM meditation rachel (www.calm.com)  Headspace  Am I Hungry? Mindful eating virtual  rachel  Www.yourweightSpimeers.org - Obesity Action Coalition sponsored Blog posts daily  Motivation rachel (black box with white \")- daily supportive messages sent to your phone     Books/Video Education/Podcasts  Mindless Eating by Aron Vail  Why We Get Sick by Gerardo Hardy (a book about insulin resistance)  Atomic Habits by Ken Fernando (a book about taking small steps to promote greater behavior change)   Can't Hurt Me by Rosendo Richard (a book exploring the power of discipline in achieving your goals)  The End of Dieting: How to Live for Life by Dr. Jovani Boyd M.D. or listen to The [employer] Podcast Episode 63: Understanding \"Nutritarian\" Eating w/Dr. Jovani Boyd  Your Body in Balance: The New Science of Food, Hormones, and Health by Dr. Stevan Andrews  The Menopause Diet Plan by Maty Gonzalez and Claritza Walsh  The Complete Guide to fasting by Dr. Lujan  Sugar, Salt & Fat by Lara Mason, Ph.D, R.D.  Weight Loss Surgery Will Not Treat Food Addiction by Carina Arriaga Ph.D  The Game Changers- Netflix Documentary on plant based nutrition  Fed Up - documentary about obesity (Free on Utube)  The Truth  About Sugar - documentary on sugar (Free on Utube, https://youtu.be/8Y5vsqrDM3f)  The Dr. Torres T5 Wellness Plan by Dr. Natanael Torres MD  Fitlosophy Fitspiration - journal to better health (found at [employer] in fitness aisle)  What Happened to You?- a look at the impact trauma has on behavior written by Lis Long and Dr. Roberto Jeter  Whole Again by Ron Marte - discovering your true self after trauma  Alin Cabrera talk on [employer], The Call to Courage  Podcasts: The Exam Room by the Physician's Committee, Nutrition Facts by Dr. Schwartz    We are here to support you with weight loss, but please remember that you still need your primary care provider for your routine health maintenance.      Return in about 3 months (around 6/24/2025) for weight management.    Patient verbalizes understanding.    Mireya Perry, NIDA           [1]   Current Outpatient Medications on File Prior to Visit   Medication Sig Dispense Refill    levothyroxine 50 MCG Oral Tab Take 1 tablet (50 mcg total) by mouth before breakfast. 90 tablet 3    naproxen 220 MG Oral Tab       cyclobenzaprine 10 MG Oral Tab Take 1 tablet (10 mg total) by mouth nightly as needed for Muscle spasms. 30 tablet 0    polyethylene glycol, PEG 3350, 17 g Oral Powd Pack Take 17 g by mouth daily.      meclizine 12.5 MG Oral Tab Take 1 tablet (12.5 mg total) by mouth 3 (three) times daily as needed. 10 tablet 0    loratadine 10 MG Oral Tab Take 1 tablet (10 mg total) by mouth daily.      Psyllium (METAMUCIL FIBER OR) Take by mouth 2 (two) times daily.      Multiple Vitamins-Minerals (MULTI-VITAMIN/MINERALS) Oral Tab Take 1 tablet by mouth daily.      Calcium Carbonate-Vitamin D 600-400 MG-UNIT Oral Tab Take 1 tablet by mouth daily.      Melatonin 5 MG Oral Tab Take 1 tablet (5 mg total) by mouth nightly. Extra 3mg if needed      Acetaminophen (TYLENOL OR) prn       No current facility-administered medications on file prior to visit.

## 2025-03-25 ENCOUNTER — LAB ENCOUNTER (OUTPATIENT)
Dept: LAB | Age: 60
End: 2025-03-25
Attending: NURSE PRACTITIONER
Payer: COMMERCIAL

## 2025-03-25 DIAGNOSIS — Z51.81 THERAPEUTIC DRUG MONITORING: ICD-10-CM

## 2025-03-25 DIAGNOSIS — E66.3 OVERWEIGHT (BMI 25.0-29.9): ICD-10-CM

## 2025-03-25 LAB
EST. AVERAGE GLUCOSE BLD GHB EST-MCNC: 117 MG/DL (ref 68–126)
HBA1C MFR BLD: 5.7 % (ref ?–5.7)
VIT B12 SERPL-MCNC: 663 PG/ML (ref 211–911)
VIT D+METAB SERPL-MCNC: 34 NG/ML (ref 30–100)

## 2025-03-25 PROCEDURE — 83036 HEMOGLOBIN GLYCOSYLATED A1C: CPT

## 2025-03-25 PROCEDURE — 36415 COLL VENOUS BLD VENIPUNCTURE: CPT

## 2025-03-25 PROCEDURE — 82607 VITAMIN B-12: CPT

## 2025-03-25 PROCEDURE — 82306 VITAMIN D 25 HYDROXY: CPT

## 2025-03-25 NOTE — PROGRESS NOTES
Elevated blood sugar (a1c) it is in the prediabetes range: 5.7% I recommend avoiding carbs, exercise, and possibly starting a medication, which will help with weight loss, prediabetes.  Mildly low Vitamin d: please start daily maintenance vitamin D 2000 Units  Vitamin B12: normal

## 2025-04-07 DIAGNOSIS — R92.333 HETEROGENEOUSLY DENSE TISSUE OF BOTH BREASTS ON MAMMOGRAPHY: ICD-10-CM

## 2025-04-07 DIAGNOSIS — Z12.31 SCREENING MAMMOGRAM FOR BREAST CANCER: Primary | ICD-10-CM

## 2025-04-09 ENCOUNTER — OFFICE VISIT (OUTPATIENT)
Dept: SLEEP CENTER | Age: 60
End: 2025-04-09
Attending: Other
Payer: COMMERCIAL

## 2025-04-09 DIAGNOSIS — Z51.81 THERAPEUTIC DRUG MONITORING: Primary | ICD-10-CM

## 2025-04-09 DIAGNOSIS — E66.3 OVERWEIGHT (BMI 25.0-29.9): ICD-10-CM

## 2025-04-09 DIAGNOSIS — R06.83 SNORING: ICD-10-CM

## 2025-04-09 DIAGNOSIS — E03.9 ACQUIRED HYPOTHYROIDISM: ICD-10-CM

## 2025-04-09 DIAGNOSIS — E78.00 HYPERCHOLESTEREMIA: ICD-10-CM

## 2025-04-09 PROCEDURE — 95806 SLEEP STUDY UNATT&RESP EFFT: CPT

## 2025-04-14 ENCOUNTER — SLEEP STUDY (OUTPATIENT)
Facility: CLINIC | Age: 60
End: 2025-04-14
Payer: COMMERCIAL

## 2025-04-14 DIAGNOSIS — G47.9 SLEEP DISORDER: Primary | ICD-10-CM

## 2025-04-14 PROCEDURE — 95806 SLEEP STUDY UNATT&RESP EFFT: CPT | Performed by: OTHER

## 2025-04-16 ENCOUNTER — PATIENT MESSAGE (OUTPATIENT)
Facility: CLINIC | Age: 60
End: 2025-04-16

## 2025-04-25 ENCOUNTER — PATIENT MESSAGE (OUTPATIENT)
Dept: INTERNAL MEDICINE CLINIC | Facility: CLINIC | Age: 60
End: 2025-04-25

## 2025-04-25 DIAGNOSIS — E66.3 OVERWEIGHT (BMI 25.0-29.9): Primary | ICD-10-CM

## 2025-04-28 RX ORDER — NALTREXONE HYDROCHLORIDE 50 MG/1
50 TABLET, FILM COATED ORAL DAILY
Qty: 30 TABLET | Refills: 2 | Status: SHIPPED | OUTPATIENT
Start: 2025-04-28

## 2025-04-29 ENCOUNTER — OFFICE VISIT (OUTPATIENT)
Dept: INTERNAL MEDICINE CLINIC | Facility: CLINIC | Age: 60
End: 2025-04-29
Payer: COMMERCIAL

## 2025-04-29 DIAGNOSIS — Z51.81 THERAPEUTIC DRUG MONITORING: ICD-10-CM

## 2025-04-29 DIAGNOSIS — E03.9 ACQUIRED HYPOTHYROIDISM: ICD-10-CM

## 2025-04-29 DIAGNOSIS — R06.83 SNORING: ICD-10-CM

## 2025-04-29 DIAGNOSIS — E78.00 HYPERCHOLESTEREMIA: ICD-10-CM

## 2025-04-29 DIAGNOSIS — E66.3 OVERWEIGHT (BMI 25.0-29.9): Primary | ICD-10-CM

## 2025-04-29 PROCEDURE — 97802 MEDICAL NUTRITION INDIV IN: CPT | Performed by: DIETITIAN, REGISTERED

## 2025-04-30 NOTE — PROGRESS NOTES
INITIAL OUTPATIENT NUTRITION CONSULTATION      Nutrition Assessment    Nutrition related diagnoses: Overweight, elevated cholesterol, hypothyroidism      Client Age and Gender: 59 year old female    Pertinent social hx: with 3 adult children, one living at home.  90+ year old father lives in her home.  Works as RN at [employer]       Labs:   HgbA1C   Date Value Ref Range Status   03/25/2025 5.7 (H) <5.7 % Final     Comment:      Normal HbA1C:     <5.7%   Pre-Diabetic:     5.7 - 6.4%   Diabetic:         >6.4%   Diabetic Control: <7.0%         Triglycerides   Date Value Ref Range Status   08/05/2024 56 30 - 149 mg/dL Final     Comment:     Reference interval for fasting triglycerides  Desirable: <150 mg/dL  Borderline: 150-199 mg/dL  High: 200-499 mg/dL  Very High: >=500 mg/dL         01/10/2020 43.00 <150.00 mg/dL Final     Triglycerides (POC)   Date Value Ref Range Status   02/23/2023 55 0 - 200 mg/dl Final     LDL Cholesterol   Date Value Ref Range Status   08/05/2024 150 (H) <100 mg/dL Final     Comment:     Optimal            <100 mg/dL   Near/Above OptimaL 100-129 mg/dL   Borderline High    130-159 mg/dL   High               160-189 mg/dL    Very High          >190 mg/dL          Calculated LDL   Date Value Ref Range Status   01/10/2020 133 (H) <130 mg/dL Final     LDL (POC)   Date Value Ref Range Status   02/23/2023 120 0 - 130 mg/dl Final     HDL Cholesterol   Date Value Ref Range Status   08/05/2024 59 40 - 59 mg/dL Final     Comment:     Interpretive Information:   An HDL cholesterol <40 mg/dL is low and constitutes a coronary heart disease risk factor. An HDL cholesterol >60 mg/dL is a negative risk factor for coronary heart disease.         Direct HDL   Date Value Ref Range Status   01/10/2020 49 >=45 mg/dL Final     Comment:     Guidelines for high density lipoprotein (HDL) are adapted from the National Cholesterol Education Program (NCEP).Values >60 mg/dL are considered a negative  risk factor for coronary heart disease (CHD) and are considered protective.     HDL (POC)   Date Value Ref Range Status   02/23/2023 58 40 - 60 mg/dl Final     AST   Date Value Ref Range Status   08/05/2024 27 <34 U/L Final     ALT   Date Value Ref Range Status   08/05/2024 25 10 - 49 U/L Final         Height:  Ht Readings from Last 1 Encounters:   03/24/25 5' 10\" (1.778 m)       Weight:   Wt Readings from Last 2 Encounters:   03/24/25 203 lb (92.1 kg)   08/12/24 197 lb (89.4 kg)       BMI Readings from Last 1 Encounters:   03/24/25 29.13 kg/m²       Diet/Weight History: Max weight of 208 lbs.  Lost 10 lbs last year for son's wedding by reducing to 2 meals daily.  When returning to 3 meals gained 20 lbs.  Struggling with weight since menopause.    Current weight loss medication: Bupropion, Naltrexone    Current Diet:  Inadequate protein, excess carbohydrate sugar from sweets and starchy foods.      Review of P records-1 week average: 1553 calories, 61 gms protein, 160 grams carbohydrate    Food/Beverage Intake: [facility]  Breakfast: Greek yogurt, 1/4 cup berries, 1 Tbsp flaxseed  Lunch: 1 HB egg, 1 side salad from hospital cafe, 1 packet ranch  Dinner: Cheeseburger on a bun, chips, ketchup and mustard  Snacks: 1 Tbsp guacamole, 5 chips  Beverages: water, unsweetened green tea with cinnamon    Meal pattern: 3 meals/d, 1 snacks/d    Number of meals/week eaten at restaurants: 2        Alcohol Intake: 2 drinks/wk, vodka lemonade    Estimated current caloric intake: 1550 cals/d    Estimated caloric needs for weight loss: 1500 cals/d for 1 pounds/week weight loss    Recommended daily macronutrient targets:  30  percent of calories from protein=113 gms/d  35  percent of calories from carbohydrate= 130 gms/d  35  percent of calories from fat= 58gms/d    Per meal targets:  300-500 calories  25-35 grams protein  25-35 grams carbohydrates    Recommended fiber intake: 25 gms/day or more    Physical Activity: 0 hrs/week .  Exercised  5 days/week in the past    Food Journal: MFP    Spent 60 minutes in consultation with the patient.      Nutrition Intervention/Education:  Comprehensive nutrition education and evaluation provided for weight loss. Pt not eating adequate protein.  Discussed goals and strategies for increasing were discussed.  Carbohydrate intake is high due to starch and sweets.  Pt was encouraged to track protein and carb intake at meals and aim for guidelines above.  Tracking per meal reccs may help pt keep on track throughout the day.  Craves sweets during the afternoon.  Discussed options that may satisfy sweet cravings without excess sugar.  Patient agreed to goals below.    Goals:   Continue tracking/ monitor intake after meals  Increase protein and reduce carbohydrate intake  Opt for lower calorie sweets as discussed    Monitoring/Evaluation:  Follow up appt scheduled with dietitian 6/11/25          Cristian Samuels MS, RD, LDN

## 2025-05-18 ENCOUNTER — PATIENT MESSAGE (OUTPATIENT)
Dept: INTERNAL MEDICINE CLINIC | Facility: CLINIC | Age: 60
End: 2025-05-18

## 2025-06-09 ENCOUNTER — PATIENT MESSAGE (OUTPATIENT)
Dept: INTERNAL MEDICINE CLINIC | Facility: CLINIC | Age: 60
End: 2025-06-09

## 2025-06-09 DIAGNOSIS — E66.3 OVERWEIGHT (BMI 25.0-29.9): Primary | ICD-10-CM

## 2025-06-09 RX ORDER — METFORMIN HYDROCHLORIDE 500 MG/1
500 TABLET, EXTENDED RELEASE ORAL 2 TIMES DAILY WITH MEALS
Qty: 60 TABLET | Refills: 1 | Status: SHIPPED | OUTPATIENT
Start: 2025-06-09

## 2025-06-09 NOTE — TELEPHONE ENCOUNTER
Message 5/19/25  Mireya Perry APRN to Caroline Coen        5/19/25 11:25 AM  MsRosanna Alfaro,  I am sorry you are getting side effects with the naltrexone.  I would reduce the dose back to the 25 mg for a couple days and then stop the medication.  We could definitely try the metformin, I would like you to be off the naltrexone for 2 weeks before we try something different to make sure the side effects have gone away.  Keep me posted if you would like me to send a prescription of the metformin to the pharmacy.  Sincerely, NIDA Zambrano

## 2025-06-11 ENCOUNTER — OFFICE VISIT (OUTPATIENT)
Dept: INTERNAL MEDICINE CLINIC | Facility: CLINIC | Age: 60
End: 2025-06-11
Payer: COMMERCIAL

## 2025-06-11 VITALS — BODY MASS INDEX: 29 KG/M2 | WEIGHT: 200 LBS

## 2025-06-11 DIAGNOSIS — E78.00 HYPERCHOLESTEREMIA: ICD-10-CM

## 2025-06-11 DIAGNOSIS — E66.3 OVERWEIGHT (BMI 25.0-29.9): Primary | ICD-10-CM

## 2025-06-11 PROCEDURE — 97803 MED NUTRITION INDIV SUBSEQ: CPT | Performed by: DIETITIAN, REGISTERED

## 2025-06-11 NOTE — PROGRESS NOTES
FOLLOW UP NUTRITION CONSULTATION    Nutrition Assessment    Nutrition related diagnoses:Overweight, high cholesterol    Age and gender: 59 year old female    Number of consultations with dietitian: 2    Height:  Ht Readings from Last 1 Encounters:   03/24/25 5' 10\" (1.778 m)       Weight:   Wt Readings from Last 2 Encounters:   06/11/25 200 lb (90.7 kg)   03/24/25 203 lb (92.1 kg)       BMI:  BMI Readings from Last 1 Encounters:   06/11/25 28.70 kg/m²       Weight change: Decrease of 3 lbs in the past 5 weeks since previous appt    Current weight loss medication: Metformin  mg x2      Current Diet/Changes in Eating Habits: Reduced carbohydrates, increased protein.  Less snacking on sweets      Diet/Lifestyle Modifications Following Previous Nutrition Consultation      Food journal: MFP consistently    Diet recall:     Breakfast: 1/3 c. Cottage cheese, banana 2% milk with 1 scoop protein powder (27 gms protein total)  Lunch: Vegetable salad with salmon, chicken or tuna with Barebell protein bar.  Sometimes uses dressing.  Once a week has taco bowl from hospital cafeteria  Dinner:Split pea soup with a vanilla meringue cookie/chicken parmesan with zucchini noodles  Snacks:one meringue cookie (15 calories)  Beverages: water, unsweetened tea    Physical activity: not discussed    Spent 60 minutes in consultation with the patient.      Nutrition Assessment and Intervention/Education:    Nutrition follow up for weight loss was provided. Pt doing well with changes to eating habits.  Recommended alternative protein powders.  Using Fairlife milk was suggested to add protein, reduce carbohydrates.  Uses of protein powder were listed.  Provided list of oxalate content of foods as  had kidney stones and pt trying to reduce oxalate in meals she preps.  Discussed additional healthy dinner options and provided recipes. Higher fiber and protein pasta alternatives were suggested. Patient agreed to goals  below.    Goals:  Switch to Fairlife milk  Change protein powder to Naked Whey brand  Switch to edamame , or chickpea pasta.   Add hearts of palm or zucchini noodles  to pasta for increased volume   Refer to oxalate food list to guide dinner prep  Trial recipes that were provided  Switch to Naked Whey protein powder    Monitoring/Evaluation: Follow up appt scheduled with dietitian 9/11        Cristian Samuels MS, RD, LDN

## 2025-07-09 ENCOUNTER — OFFICE VISIT (OUTPATIENT)
Facility: CLINIC | Age: 60
End: 2025-07-09
Payer: COMMERCIAL

## 2025-07-09 VITALS
SYSTOLIC BLOOD PRESSURE: 128 MMHG | HEART RATE: 77 BPM | HEIGHT: 70 IN | BODY MASS INDEX: 28.63 KG/M2 | DIASTOLIC BLOOD PRESSURE: 76 MMHG | RESPIRATION RATE: 18 BRPM | TEMPERATURE: 98 F | OXYGEN SATURATION: 95 % | WEIGHT: 200 LBS

## 2025-07-09 DIAGNOSIS — G47.33 OSA (OBSTRUCTIVE SLEEP APNEA): Primary | ICD-10-CM

## 2025-07-09 DIAGNOSIS — G47.00 INSOMNIA, UNSPECIFIED TYPE: ICD-10-CM

## 2025-07-09 DIAGNOSIS — G47.19 DAYTIME HYPERSOMNOLENCE: ICD-10-CM

## 2025-07-09 PROCEDURE — 99204 OFFICE O/P NEW MOD 45 MIN: CPT | Performed by: INTERNAL MEDICINE

## 2025-07-09 RX ORDER — ZOLPIDEM TARTRATE 5 MG/1
10 TABLET ORAL NIGHTLY PRN
Qty: 2 TABLET | Refills: 0 | Status: SHIPPED | OUTPATIENT
Start: 2025-07-09

## 2025-07-09 NOTE — PROGRESS NOTES
The following individual(s) verbally consented to be recorded using ambient AI listening technology and understand that they can each withdraw their consent to this listening technology at any point by asking the clinician to turn off or pause the recording:    Patient name: Lorenza Alfaro            Pulmonary/Critical Care/Sleep Medicine    Consult Note     PCP: Dimas Gresham MD   Phone: [phone]   Fax: [phone]        Reason for Consultation:  Obstructive Sleep Apnea Syndrome     History of Present Illness:  History of Present Illness  Lorenza Alfaro is a 59 year old female with GERD and hypothyroidism who presents with sleep disturbances and suspected sleep apnea.      She has a history of sleep disturbances and underwent a sleep study years ago which did not diagnose sleep apnea. Recently, she has been experiencing frequent awakenings with a sensation of choking, and her family has noted increased snoring. A recent home sleep study was conducted, leading to her referral for further evaluation.    Her sleep routine involves going to bed around 8 PM, taking about an hour to fall asleep, and waking up at 5:30 AM. She wakes up approximately three times during the night, once to use the bathroom and other times to check if it is still dark. She experiences daytime sleepiness and sometimes has morning headaches. No sleepwalking or sleep talking, and rarely experiences nightmares. She consumes one glass of tea daily and rarely drinks soda.    She has a history of GERD, for which she was recently prescribed omeprazole due to frequent sore throats, with reported improvement in symptoms. She also experiences constipation and occasional joint pain, which she associates with changes in the barometer. 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 She has a history of GERD, hemorrhoids, migraine headaches, and hypothyroidism, which is managed with Synthroid. No history of kidney, liver, lung, heart, or sinus disease, as well as diabetes or hypertension.    Her family history includes a father with heart disease and hypertension, a maternal grandfather with leukemia, and a maternal grandmother with breast cancer. 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    She is sleepy and fatigued during the daytime.  She admits to tossing and turning at night.    She admits to occasional  AM headaches.  She denies symptoms sleep attack     The patient denies kicking legs at night. Denies teeth grinding.      She drinks 1 caffeine glasses of tea and  rare caffeine cans of soda daily.     The patient  has no pets      History:  Past Medical History[1]  Past Surgical History[2]  Family History[3]   reports that she has never smoked. She has never used smokeless tobacco. She reports current alcohol use. She reports that she does not use drugs.    Allergies:  Allergies[4]    Medications:  Current Hospital Medications[5]         Review of Systems:   A comprehensive 10 point review of systems was completed.  Pertinent positives and negatives noted in the the HPI.    Review of Systems   Constitutional:  Positive for fatigue. Negative for fever and unexpected weight change.   HENT:  Negative for congestion, mouth sores, nosebleeds, postnasal drip, rhinorrhea, sore throat and trouble swallowing.    Eyes:  Negative for visual disturbance.   Respiratory:  Negative for apnea, cough, choking, chest tightness, shortness of breath and wheezing.    Cardiovascular:  Negative for chest pain, palpitations and leg swelling.   Gastrointestinal:  Positive for constipation. Negative for abdominal pain, diarrhea, nausea and vomiting.        Heart burns      Genitourinary:  Negative for  difficulty urinating.   Musculoskeletal:  Positive for arthralgias and back pain. Negative for gait problem and myalgias.   Neurological:  Positive for headaches. Negative for dizziness and weakness.   Psychiatric/Behavioral:  Positive for sleep disturbance.          Vitals:    07/09/25 1252   BP: 128/76   Pulse: 77   Resp: 18   Temp: 98 °F (36.7 °C)      SpO2: 95 %  Ht Readings from Last 1 Encounters:   07/09/25 5' 10\" (1.778 m)     Wt Readings from Last 1 Encounters:   07/09/25 200 lb (90.7 kg)     Body mass index is 28.7 kg/m².     Physical Exam  Constitutional:       General: She is not in acute distress.     Appearance: Normal appearance. She is not ill-appearing or diaphoretic.   HENT:      Head: Normocephalic and atraumatic.      Nose: Nose normal. No congestion or rhinorrhea.      Comments: Narrow right nares > Left       Mouth/Throat:      Mouth: Mucous membranes are moist.      Pharynx: Oropharynx is clear. No oropharyngeal exudate or posterior oropharyngeal erythema.      Comments: Mallampati class III palate   Eyes:      Extraocular Movements: Extraocular movements intact.      Pupils: Pupils are equal, round, and reactive to light.   Cardiovascular:      Rate and Rhythm: Normal rate.      Pulses: Normal pulses.      Heart sounds: Normal heart sounds. No murmur heard.  Pulmonary:      Effort: Pulmonary effort is normal. No respiratory distress.      Breath sounds: Normal breath sounds. No wheezing or rhonchi.   Chest:      Chest wall: No tenderness.   Abdominal:      General: Abdomen is flat. Bowel sounds are normal.      Palpations: Abdomen is soft.   Musculoskeletal:         General: Normal range of motion.   Skin:     General: Skin is warm.   Neurological:      General: No focal deficit present.      Mental Status: She is alert and oriented to person, place, and time.   Psychiatric:         Mood and Affect: Mood normal.         Behavior: Behavior normal.         Thought Content: Thought content  normal.         Judgment: Judgment normal.                Labs:  Last BMP  Lab Results   Component Value Date    GLU 92 08/05/2024    BUN 22 08/05/2024    CREATSERUM 1.01 08/05/2024    BUNCREA 17.4 10/05/2022    ANIONGAP 3 08/05/2024    GFRAA 71 10/01/2020    GFRNAA 62 10/01/2020    CA 10.0 08/05/2024     08/05/2024    K 3.9 08/05/2024     08/05/2024    CO2 28.0 08/05/2024    OSMOCALC 289 08/05/2024      Last CBC  Lab Results   Component Value Date    WBC 4.8 08/05/2024    RBC 4.41 08/05/2024    HGB 13.0 08/05/2024    HCT 37.8 08/05/2024    MCV 85.7 08/05/2024    MCH 29.5 08/05/2024    MCHC 34.4 08/05/2024    RDW 11.9 08/05/2024    .0 08/05/2024      Last CMP  Lab Results   Component Value Date    GLU 92 08/05/2024    BUN 22 08/05/2024    BUNCREA 17.4 10/05/2022    CREATSERUM 1.01 08/05/2024    ANIONGAP 3 08/05/2024    GFR 68 10/26/2017    GFRNAA 62 10/01/2020    GFRAA 71 10/01/2020    CA 10.0 08/05/2024    OSMOCALC 289 08/05/2024    ALKPHO 74 08/05/2024    AST 27 08/05/2024    ALT 25 08/05/2024    BILT 0.8 08/05/2024    TP 7.5 08/05/2024    ALB 4.9 (H) 08/05/2024    GLOBULIN 2.6 08/05/2024     08/05/2024    K 3.9 08/05/2024     08/05/2024    CO2 28.0 08/05/2024      Last Thyroid Function  Lab Results   Component Value Date    T4F 0.8 (L) 11/22/2017    TSH 2.813 12/02/2024        Imaging personally reviewed:  No results found.            [facility]       Accredited by the American Academy of Sleep Medicine (AASM)     PATIENT'S NAME:        PHILL CR  ATTENDING PHYSICIAN:   Sarwat Jenkins DO  REFERRING PHYSICIAN:     PATIENT ACCOUNT #:     [id number]        LOCATION:       [facility]  MEDICAL RECORD #:      [id number]        YOB: 1965  DATE OF STUDY:         04/09/2025     SLEEP STUDY REPORT     STUDY TYPE:  Unattended sleep study.     CLINICAL HISTORY:  This is a 59-year-old female, whose body mass index is 29, who is undergoing evaluation for  sleep-disordered breathing due to weight gain and snoring, awakenings due to choking sensation, interrupted sleep.  This is a report of her home sleep test.       UNATTENDED SLEEP STUDY RECORDING PARAMETERS:  The patient underwent a formal technically adequate unattended diagnostic sleep study coordinated with the [facility].  The study was performed in accordance with the AASM standard for Out of Center Sleep Testing.  The four-channel Type III HST measures the following parameters:  flow, respiratory effort, pulse, and oxygen saturation.     SCORING:  This study was scored in accordance with AASM scoring rules and Medicare rule 1B.     FINDINGS:  The flow evaluation recording time began at 8:40 p.m. and ended at 4:51 a.m., for a duration of 7 hours and 55 minutes.  Light snoring was recorded.  There was a total of 12 hypopneas and 27 apneas.  Overall apnea-hypopnea index was 4.9.  Supine index was 2.0.  Non-supine was 8.9.  SaO2 deloris was 86%.  Total time spent with oxyhemoglobin saturations less than 88% was 7 minutes.  Average pulse was 69.     IMPRESSION:  The overall apnea-hypopnea index is at 4.9 and in the normal range.     DIAGNOSIS:  Snoring.     RECOMMENDATIONS:  Given high clinical suspicion for sleep-disordered breathing, a repeat in-lab diagnostic study is advised.     Dictated By Sarwat Jenkins DO  d:04/15/2025 09:59:01    [place] Sleepiness Scale: (ESS) score on today's visit is 10  out of 24.     Score total of 1-6    Normal sleep   Score total of 7-8    Average sleepiness   Score total of 9-24    Abnormal (possibly pathologic) sleepiness       Assessment:  Assessment & Plan  Obstructive Sleep Apnea  Symptoms include waking up feeling like choking, increased snoring, and daytime sleepiness. Home sleep study showed an AHI of 4.9 events per hour, non-supine AHI of 8.9, and SpO2 deloris of 86%, with oxygen saturation less than 88% for 7 minutes, suggesting an underestimation of sleep-disordered  breathing. Risks of untreated sleep apnea include cardiovascular complications. A definitive diagnosis requires an attended sleep study.  - Order an attended sleep study with EEG monitoring to confirm the diagnosis of sleep apnea.  - Prescribe Ambien 5 mg for use during the sleep study; if ineffective, increase to 10 mg.    Gastroesophageal Reflux Disease (GERD)  GERD managed with omeprazole. Sleep apnea can exacerbate reflux symptoms.  - Continue omeprazole as prescribed by ENT.  - Provide reflux precautions.    Hypothyroidism  Managed with Synthroid. Previous labs showed a slightly low free T4, but TSH was within normal limits. Insomnia can occur with hyperthyroidism,     Chronic Back Pain  Attributed to work as a nurse, involving bending and lifting patients, resulting in musculoskeletal strain.    Constipation  Reports experiencing constipation.         Follow up:  3 months     Tobacco Use: Low Risk  (7/9/2025)    Patient History     Smoking Tobacco Use: Never     Smokeless Tobacco Use: Never     Passive Exposure: Not on file        Thank You for allowing me to participate in this patient's care     Cameron Luther MD        Note to the patient: The 21st Century Cures Act makes medical notes like these available to patients in the interest of transparency. However, be advised that this is a medical document. It is intended as peer to peer communication. It is written in medical language and may contain abbreviations or verbiage that are unfamiliar. It may appear blunt or direct. Medical documents are intended to carry relevant information, facts as evident, and clinical opinion of the practitioner.      Disclaimer: Components of this note were documented using voice recognition system and are subject to errors not corrected at proofreading. Contact the author of this note for any clarifications         [1]   Past Medical History:   Constipation    Detached retina, right    Exposure to TB    At work    Frequent use  of laxatives    To become more regular    GERD (gastroesophageal reflux disease)    Heartburn    Heavy menses    Hemorrhoids    HOSPITALIZATIONS    viral meningtitis    Hypothyroidism    Irregular bowel habits    Menses painful    Migraines    Nausea    At work. 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    Visual impairment    Wears glasses    Weight gain    Weight loss    Intentional   [2]   Past Surgical History:  Procedure Laterality Date    Colonoscopy      D & c      Dilation/curettage,diagnostic     [3]   Family History  Problem Relation Age of Onset    Heart Disorder Father 72        MI x 2, first at 72    Lipids Father     Hypertension Father     Heart Attack Father     Other (Other) Maternal Grandmother         dementia - not alzheimers    Cancer Maternal Grandfather          of leukemia    Stroke Maternal Grandfather     Cancer Paternal Grandmother         breast ca    Breast Cancer Paternal Grandmother 60    Heart Disorder Paternal Grandfather          of multiple strokes   [4] No Known Allergies  [5] No current facility-administered medications for this visit.

## 2025-07-09 NOTE — PATIENT INSTRUCTIONS
Schedule Home sleep study to be interpreted by Dr. Cameron Luther, will then arrange a NEW CPAP machine   GERD precautions  Advised about weight loss   Advised against drowsy driving and to avoid alcoholic beverage and respiratory depressants as these may worsen sleep apnea    Follow up: 3 months       Cameron Luther MD      Obstructive Sleep Apnea  Obstructive sleep apnea is a condition caused by air passages becoming narrowed or blocked during sleep. As a result, breathing stops for short periods. Your body wakes up enough for breathing to start again. But you don't remember it. The cycle of stopped breathing and brief awakenings can repeat dozens of times a night. This prevents the body from getting to the deeper stages of sleep that are needed for good rest.   Signs of sleep apnea include loud snoring, noisy breathing, and gasping sounds during sleep. People with sleep apnea often find they use the bathroom many times during the night. Daytime symptoms include waking up tired after a full night's sleep and waking up with headaches. They can also include feeling very sleepy or falling asleep during the day, and having problems with memory or concentration.   Risk factors for sleep apnea include:  Being overweight  Being assigned male at birth, or being in menopause  Smoking  Using alcohol or sedating medicines  Having enlarged structures in the nose or throat such as enlarged tonsils or adenoids, or extra tissue in the airway  Home care  Lifestyle changes that can help treat snoring and sleep apnea include:   If you're overweight, talk with your healthcare provider about a weight-loss plan for you.  Don't drink alcohol for 3 to 4 hours before bedtime.  Don't take sedating medicines. Ask your healthcare provider about the medicines you take.  If you smoke, talk to your provider about ways to quit. It's important to stay away from secondhand smoke. Don't use e-cigarettes because of their harmful side effects.  Sleep  on your side. This can help prevent gravity from pulling relaxed throat tissues into your breathing passages.  If you have allergies or sinus problems that block your nose, ask your provider for help.  Use positive airway pressure (PAP). Discuss with your provider the benefits of using PAP at home. And talk about the type of PAP that's best for you.  Follow-up care  Follow up with your healthcare provider, or as advised. A diagnosis of sleep apnea is made with a sleep study. Your provider can tell you more about this test.   When to get medical care  See your healthcare provider if you have daytime symptoms of sleep apnea. These include:   Waking up tired after a full night's sleep  Waking up with a headache  Feeling very sleepy or falling asleep during the day  Having problems with memory or concentration  Also talk with your provider if your partner tells you that you snore, gasp for air, or stop breathing while you sleep.   Seeing your provider is important because sleep apnea can make you more likely to have certain health problems. These include high blood pressure, heart attack, stroke, and sexual dysfunction. If you have sleep apnea, talk with your healthcare provider about the best treatments for you.   [employer] last reviewed this educational content on 5/1/2022 © 2000-2023 [employer]. All rights reserved. This information is not intended as a substitute for professional medical care. Always follow your healthcare professional's instructions.        Continuous Positive Air Pressure (CPAP)     A mask over the nose gently directs air into the throat to keep the airway open.     Continuous positive air pressure (CPAP) uses gentle air pressure to hold the airway open. CPAP is often the most effective treatment for sleep apnea. It works very well as a treatment for adults diagnosed with obstructive sleep apnea with a lot of sleepiness. But keep in mind that it can take several adjustments before  the setup is right for you.   How CPAP works  The CPAP machine  is a small portable pump that sits beside the bed. The pump sends air through a hose, which is held over your nose alone, or nose and mouth by a mask. Mild air pressure is gently pushed through your airway. The air pressure nudges sagging tissues aside. This widens the airway so you can breathe better. CPAP may be combined with other kinds of therapy for sleep apnea.   Types of air pressure treatments  There are different types of CPAP. Your doctor or CPAP technician will help you decide which type is best for you:   Basic CPAP keeps the pressure constant all night long.  A bilevel device (BiPAP) provides more pressure when you breathe in and less when you breathe out. A BiPAP machine also may be set to provide automatic breaths to maintain breathing if you stop breathing while sleeping.  An autoCPAP device automatically adjusts pressure throughout the night and in response to changes such as body position, sleep stage, and snoring.  [employer] last reviewed this educational content on 7/1/2019 © 2000-2023 [employer]. All rights reserved. This information is not intended as a substitute for professional medical care. Always follow your healthcare professional's instructions.  GERD (Adult)    The esophagus is a tube that carries food from the mouth to the stomach. A valve (lower esophageal sphincter) prevents stomach acid from flowing upward. Sometimes this valve doesn't work correctly. Then stomach contents may flow (reflux) into the esophagus. When it happens again and again, it's called GERD (gastroesophageal reflux disease). GERD can irritate the esophagus. It can cause pain. It can also cause problems with swallowing or breathing. In severe cases, GERD can cause pneumonia that keeps coming back. This is from breathing in particles (aspiration).   Symptoms of reflux include burning, pressure, or sharp pain in the upper belly (abdomen).  Symptoms may also be in the mid- to lower chest. The pain can spread to the neck, back, or shoulder. You may have:   Belching  Acid taste in the back of the throat  Chronic cough  Sore throat  Hoarseness  GERD symptoms often occur during the day after a big meal. They can also occur at night when lying down.    Home care  Lifestyle changes can help ease symptoms. Your healthcare provider may also prescribe medicines. Symptoms often get better with treatment. But if treatment is stopped, the symptoms often return after a few months. Most people with GERD will need to continue treatment. Or they may need treatment on and off.   Lifestyle changes  Limit or don't eat fatty, fried, or spicy foods. Also limit coffee, chocolate, mint, and foods with high acid content. These include tomatoes and citrus fruit and juices (orange, grapefruit, and lemon).  Don’t eat large meals, especially at night. Frequent, smaller meals are best. Don't lie down right after eating. And don’t eat anything 3 hours before going to bed.  Don't drink alcohol or smoke. As much as possible, stay away from secondhand smoke.  If you are overweight, losing weight will reduce symptoms.   Don't wear tight clothing around your stomach area.  If your symptoms occur during sleep, use a foam wedge to raise your upper body not just your head. Or place 4-inch blocks under the head of your bed. Or use 2 bed risers under your bed frame.  Talk with your provider if you have trouble making the suggested lifestyle changes. They may be able to give you resources to help.  Medicines  Medicines can help ease the symptoms of GERD. They also help prevent damage to the esophagus. Discuss a medicine plan with your healthcare provider. This may include one or more of these medicines:   Antacids. These help neutralize the normal acids in your stomach.  Acid blockers (histamine or H2 blockers). These decrease how much acid your stomach makes.  Acid inhibitors (proton pump  inhibitors PPIs). These also decrease how much acid your body makes, but in a different way from the blockers. They may work better. But they can take a little longer to do so.  Take an antacid 30 to 60 minutes after eating and at bedtime, but not at the same time as an acid blocker. Try not to take medicines such as ibuprofen and aspirin. If you take aspirin for your heart or other health reasons, talk with your healthcare provider about stopping it.   Follow-up care  Follow up with your healthcare provider as advised.   When to seek medical advice  Call your healthcare provider if any of the following occur:  Stomach pain gets worse or moves to the lower right belly (appendix area)  Chest pain appears or gets worse, or spreads to the back, neck, shoulder, or arm  An over-the-counter trial of medicine doesn't relieve your symptoms  Weight loss that can't be explained  Trouble or pain swallowing  Frequent vomiting (can’t keep down liquids)  Blood in the stool or vomit (red or black in color)  Feeling weak or dizzy  Fever of 100.4ºF (38ºC) or higher, or as directed by your healthcare provider  Symptoms get worse or you have new symptoms  [employer] last reviewed this educational content on 11/1/2021 © 2000-2023 [employer]. All rights reserved. This information is not intended as a substitute for professional medical care. Always follow your healthcare professional's instructions.      What Is GERD?  If you often have a painful burning feeling in your chest after you eat, you may have gastroesophageal reflux disease (GERD). Heartburn that keeps coming back is a classic symptom of GERD. But you may have other symptoms as well. A GERD diagnosis is made only after a complete evaluation by your healthcare provider.   Note: Chest pain may also be caused by heart problems. Be sure to have all chest pain evaluated by a healthcare provider.   When you have a reflux problem  After you eat, food travels from your  mouth down the esophagus to your stomach. Along the way, food passes through a 1-way valve called the lower esophageal sphincter (LES). The LES sits at the opening to your stomach. Normally the LES opens when you swallow. It lets food enter the stomach, then closes quickly. With GERD, the LES doesn’t work normally. It lets food and stomach acid flow back (reflux) into the esophagus.      With GERD, the weak LES allows food and fluids to travel back, or reflux, into the esophagus.       Some common symptoms  Frequent heartburn  Frequent burping  Sour- or acid-tasting fluid backing up into your mouth  Symptoms that get worse after you eat, bend over, or lie down  Trouble swallowing or pain when swallowing  A dry, long-term (chronic) cough  Upset stomach (nausea). But nausea and vomiting may be a sign of something else, so be sure to discuss with your provider.     Relieving your discomfort  You and your healthcare provider can work together to find the treatment options that best ease your symptoms. These may include lifestyle changes, medicine, and possibly surgery.   Many people find their GERD symptoms decrease when they eat small frequent meals instead of 3 large ones. Reducing the amount of fatty foods in your diet will also help.    The following foods tend to cause problems for people diagnosed with GERD:   Tomatoes and tomato products  Alcohol  Coffee  Peppermint  Greasy or spicy foods  Acidic foods such as citrus  To ease your symptoms, raise the head of your bed 4 to 6 inches. Don't eat anything within 2 to 3 hours of laying down.   Losing weight, if you are overweight, often eases GERD symptoms.  Stopping smoking can also help GERD symptoms.  Talk with your provider if you don’t understand how to make the dietary changes needed to control your GERD symptoms. Your provider can refer you to a nutritionist.   [employer] last reviewed this educational content on 9/1/2021  © 7232-3915 [employer].  All rights reserved. This information is not intended as a substitute for professional medical care. Always follow your healthcare professional's instructions.

## 2025-07-22 ENCOUNTER — OFFICE VISIT (OUTPATIENT)
Dept: INTERNAL MEDICINE CLINIC | Facility: CLINIC | Age: 60
End: 2025-07-22
Payer: COMMERCIAL

## 2025-07-22 VITALS
WEIGHT: 200 LBS | HEIGHT: 70 IN | OXYGEN SATURATION: 93 % | BODY MASS INDEX: 28.63 KG/M2 | DIASTOLIC BLOOD PRESSURE: 78 MMHG | HEART RATE: 88 BPM | RESPIRATION RATE: 18 BRPM | SYSTOLIC BLOOD PRESSURE: 126 MMHG

## 2025-07-22 DIAGNOSIS — R73.03 PREDIABETES: ICD-10-CM

## 2025-07-22 DIAGNOSIS — Z51.81 THERAPEUTIC DRUG MONITORING: Primary | ICD-10-CM

## 2025-07-22 DIAGNOSIS — E66.3 OVERWEIGHT (BMI 25.0-29.9): ICD-10-CM

## 2025-07-22 DIAGNOSIS — E78.00 HYPERCHOLESTEREMIA: ICD-10-CM

## 2025-07-22 DIAGNOSIS — R06.83 SNORING: ICD-10-CM

## 2025-07-22 DIAGNOSIS — E03.9 ACQUIRED HYPOTHYROIDISM: ICD-10-CM

## 2025-07-22 PROCEDURE — 99214 OFFICE O/P EST MOD 30 MIN: CPT | Performed by: NURSE PRACTITIONER

## 2025-07-22 NOTE — PROGRESS NOTES
HISTORY OF PRESENT ILLNESS  Chief Complaint   Patient presents with    Weight Check     Down 3 lb.3/24/25     Lorenza Alfaro is a 59 year old female here for follow up with medical weight loss program for the treatment of overweight, obesity, or morbid obesity.     Down 3 lbs lbs first month follow-up from 3/2025  Compliant on generic contrave (wellbutrin + naltrexone) stopped after 1 month due to headaches and triggers     Has been eating more, and add in breakfast  Has been meeting with Cristian regularly   Success: picking healthier snacks  Challenging: when deserts are bought into work  Home scale weight #196 lbs   Has questions about GLP-1 meds   Exercise/Activity: 4x/ week, via walking, wall piliates   Nutrition: eating regular meals, +protein, minimal veggies. + tracking reports (MFP)  Meals out per week on average: 1  Stress is manageable- 7/10  Sleep: 7 hours/night, waking up feeling rested    Denies chest pain, shortness of breath, dizziness, blurred vision, headache, paresthesia, nausea/vomiting.     Breakfast Lunch Dinner Snacks Fluids   Reviewed              Wt Readings from Last 6 Encounters:   07/22/25 200 lb (90.7 kg)   07/09/25 200 lb (90.7 kg)   06/11/25 200 lb (90.7 kg)   03/24/25 203 lb (92.1 kg)   08/12/24 197 lb (89.4 kg)   06/04/24 190 lb (86.2 kg)          REVIEW OF SYSTEMS  GENERAL: feels well otherwise, denied any fevers chills or night sweats   LUNGS: denies shortness of breath  CARDIOVASCULAR: denies chest pain  GI: denies abdominal pain  MUSCULOSKELETAL:  +back pain, joint pains (bilat. Knee pain)   PSYCH: denies change in behavior or mood, denies feeling sad or depressed    EXAM  /78   Pulse 88   Resp 18   Ht 5' 10\" (1.778 m)   Wt 200 lb (90.7 kg)   SpO2 93%   BMI 28.70 kg/m²       GENERAL: well developed, well nourished, in no apparent distress, A/O x3  SKIN: no rashes, no suspicious lesions  HEENT: atraumatic, normocephalic, OP-clear, PERRLA  NECK: supple, no  adenopathy  LUNGS: CTA in all fields, breathing non labored  CARDIO: RRR without murmur  GI: +BS, NT/ND, no masses or HSM  EXTREMITIES: no cyanosis, no clubbing, no edema    Lab Results   Component Value Date    GLU 92 08/05/2024    BUN 22 08/05/2024    BUNCREA 17.4 10/05/2022    CREATSERUM 1.01 08/05/2024    ANIONGAP 3 08/05/2024    GFR 68 10/26/2017    GFRNAA 62 10/01/2020    GFRAA 71 10/01/2020    CA 10.0 08/05/2024    OSMOCALC 289 08/05/2024    ALKPHO 74 08/05/2024    AST 27 08/05/2024    ALT 25 08/05/2024    BILT 0.8 08/05/2024    TP 7.5 08/05/2024    ALB 4.9 (H) 08/05/2024    GLOBULIN 2.6 08/05/2024     08/05/2024    K 3.9 08/05/2024     08/05/2024    CO2 28.0 08/05/2024     Lab Results   Component Value Date     03/25/2025    A1C 5.7 (H) 03/25/2025     Lab Results   Component Value Date    CHOLEST 219 (H) 08/05/2024    TRIG 56 08/05/2024    HDL 59 08/05/2024     (H) 08/05/2024    VLDL 11 08/05/2024    TCHDLRATIO 3.46 10/26/2017    NONHDLC 160 (H) 08/05/2024    CHOLHDLRATIO 3 02/23/2023     Lab Results   Component Value Date    B12 663 03/25/2025     Lab Results   Component Value Date    VITD 34.0 03/25/2025       Medications Ordered Prior to Encounter[1]    ASSESSMENT/PLAN    ICD-10-CM    1. Therapeutic drug monitoring  Z51.81       2. Overweight (BMI 25.0-29.9)  E66.3       3. Acquired hypothyroidism  E03.9       4. Hypercholesteremia  E78.00       5. Snoring  R06.83       6. Prediabetes  R73.03           PLAN   Initial Weight Data and Goal Weight Loss:  Initial consult: #203 lbs on 3/2025  Weight Calculations  Initial Weight: 203 lbs  Initial Weight Date: 03/24/25  Today's Weight: 200 lbs  5% Goal: 10.15  10% Goal: 20.3  Total Weight Loss: 3 lbs  Total weight loss: Down 3 lbs total, Net loss 3 lbs  Had stopped medications: generic contrave (wellbutrin + naltrexone) - due to side effects   Continue with medications: metformin 500mg bid   Is going to check with insurance on coverage  for GLP-1  --advised of side effects and adverse effects of this medication  --Instructed to use contraception at all times while on AOMs  Contradictions: none  Reviewed labs  HLD uncontrolled, elevated total cholesterol and LDL) follows with PCP   Snoring, has orders for in office sleep study in sept 2025   Joint pain, can think about doing aqua therapy  Prediabetes, A1c 5.7% on 3/2025 - will continue with metformin   Hypothyroid- stable, continue with current medication regimen, managed by PCP    Wrote out macros and encouraged tracking food  Nutrition: Low carb diet, recommended to eat breakfast daily/ regular protein intake  Follow up with dietitian and psychologist as recommended.  Discussed the role of sleep and stress in weight management.  Counseled on comprehensive weight loss plan including attention to nutrition, exercise and behavior/stress management for success. See patient instruction below for more details.  Discussed strategies to overcome barriers to successful weight loss and weight maintenance  FITTE: ACSM recommendations (150-300 minutes/ week in active weight loss)   Weight Loss Consent to treat reviewed and signed.    Total time spent on chart review, pre-charting, obtaining history, counseling, and educating, reviewing labs was 30 minutes.       NOTE TO PATIENT: The 21st Century Cures Act makes clinical notes like these available to patients in the interest of transparency. Clinical notes are medical documents used by physicians and care providers to communicate with each other. These documents include medical language and terminology, abbreviations, and treatment information that may sound technical and at times possibly unfamiliar. In addition, at times, the verbiage may appear blunt or direct. These documents are one tool providers use to communicate relevant information and clinical opinions of the care providers in a way that allows common understanding of the clinical context.     There  are no Patient Instructions on file for this visit.    No follow-ups on file.    Patient verbalizes understanding.    NIDA Zambrano             [1]   Current Outpatient Medications on File Prior to Visit   Medication Sig Dispense Refill    metFORMIN  MG Oral Tablet 24 Hr Take 1 tablet (500 mg total) by mouth 2 (two) times daily with meals. 60 tablet 1    PEG 3350-KCl-Na Bicarb-NaCl 420 g Oral Recon Soln Take as directed by physician 4000 mL 0    levothyroxine 50 MCG Oral Tab Take 1 tablet (50 mcg total) by mouth before breakfast. 90 tablet 3    cyclobenzaprine 10 MG Oral Tab Take 1 tablet (10 mg total) by mouth nightly as needed for Muscle spasms. 30 tablet 0    polyethylene glycol, PEG 3350, 17 g Oral Powd Pack Take 17 g by mouth daily.      meclizine 12.5 MG Oral Tab Take 1 tablet (12.5 mg total) by mouth 3 (three) times daily as needed. 10 tablet 0    loratadine 10 MG Oral Tab Take 1 tablet (10 mg total) by mouth daily.      Psyllium (METAMUCIL FIBER OR) Take by mouth 2 (two) times daily.      Multiple Vitamins-Minerals (MULTI-VITAMIN/MINERALS) Oral Tab Take 1 tablet by mouth daily.      Calcium Carbonate-Vitamin D 600-400 MG-UNIT Oral Tab Take 1 tablet by mouth daily.      Melatonin 5 MG Oral Tab Take 1 tablet (5 mg total) by mouth nightly. Extra 3mg if needed      Acetaminophen (TYLENOL OR) prn      zolpidem 5 MG Oral Tab Take 2 tablets (10 mg total) by mouth nightly as needed for Sleep. Take 1 -2 tablets as needed to treat insomnia on the night of sleep study (Patient not taking: Reported on 7/22/2025) 2 tablet 0    naproxen 220 MG Oral Tab  (Patient not taking: Reported on 7/22/2025)       No current facility-administered medications on file prior to visit.

## 2025-07-22 NOTE — PATIENT INSTRUCTIONS
Next steps:  1.  Fill your prescribed medication and take as discussed and prescribed: metformin  - check with insurance on coverage for GLP-1 (wegovy or zepbound) and if not can think about doing lillydirect ($349)  2.  Continue with meeting with dietitian as directed       Please try to work on the following dietary changes:  Daily protein recommendation to start:  grams  Daily carbohydrate: <120g  Daily calories: 1,400  1.  Drink water with meals and throughout the day, cut down on soda and/or juice if consumed. Consider flavored water options like Bubbly, Spindrift, Hint and Pb. Reduce alcohol servings to 4 per week maximum.  2.  Have protein with each meal, examples include: greek yogurt, cottage cheese, hard boiled egg, tofu, chicken, fish, or tuna. Recommended daily protein intake is 100 grams/day.   3.  Work towards reducing/eliminating refined carbohydrates and sugars which includes items such as sweets, as well as rice, pasta, and bread and make sure to choose whole grain options when having them with just 1 serving per meal about the size of your inner palm.  4.  Consume non starchy veggies daily working towards making them a good 50% of your daily food intake. Add them to lunch and dinner consistently.  5.  Start a daily probiotic: VSL#3 is recommended, (order on line at www.vsl3.com). Take 1 capsule daily with water for 30 days, then reduce to 1 every other day (this will reduce the cost). Capsules can be left out of refrigerator for 2 weeks. I recommend using a pill box weekly and keeping the bottle in the fridge.     Please download rachel My Fitness Pal, LoseIt! Or My Net Diary to monitor daily dietary intake and you will be able to see if you are eating the right amount of calories or too much or too little which would hinder weight loss. Additionally this will help to see your daily carbohydrate and protein intake. When you set the rachel up choose 1.5 lbs/week as a goal.  Keeping a paper food  journal is an option as well to remain accountable for your choices- this is the start to mindful eating! A low calorie diet has been consistently shown to support weight loss.     Continue or start exercising to help establish a routine. If not already exercising begin with 1 day/week and progress as able with the goal of working towards 30 minutes 5 days a week at a minimum. A variety or cardio, strength and stretching is important. Review resources below to help support you in building this healthy routine.     Meditation daily can help manage and control stress. Chronic stress can make weight loss difficult.  Exercising is one way to help with stress, but meditation using the CALM Juliana or another comparable alternative can be done in your home or place of work with little time commitment. This Juliana can also help work on behavior change and improve sleep. Check out the segment under Calm Masterclass and listen to The 4 Pillars of Health. A great way to begin learning about the foundation of lifestyle with practical tips to use in your every day. In addition, we offer counseling services and support for individual connection and care. A referral is necessary so please let me know if this is a service you are interested.     Check out www.yourweightmatters.org blog for continued support and education along your weight loss journey to optimal health!  Patient Resources:     Personal Training/Fitness Classes/Health Coaching     [facility] in [place]: Full fitness center with group fitness and personal training located in [place].  Health Coaching with Angely Vela, J Carlos Irizarry, and Gene Solitario at our [place] [facility]- individual coaching to work on your health goals. Call [phone] and/or email @ [email or web address]. Free 60 minute consult when client of [facility].  HOMA French @ http://www.ofeAllendale County Hospitalalma rosa.ripplrr inc. A variety of group fitness options plus  various yoga classes [phone] and/or email Isaura at [email or web address]  [facility] Fitness Centers with multiple locations: Medikidz Fitness (www.Gen110.Kelso Technologies), F45 Training (www.n82pxkpufso.Kelso Technologies), Fit Body Bootcamp (www.Tableau SoftwarebodybootVoltairep.[employer]so Technologies), BrandYourself (www.Axial Biotech.[employer]), The Exercise  (www.exercisecoach.com), Club Pilates (www.clubWatsi.[employer]so Technologies)     Online Fitness  Fitness  on PeriphaGen  Fit in 10 DVD series                              www.ujogh07DRUZympi  Chair exercises via Sit and Be Fit (www.sitandbefit.org) and RED - [employer] (www.Funji) or Scotty No or Erick Gonzales videos on Paprika Labube.  Hip Hop Fit with Oneil Ferraraks at www.hiphopfit.Bluegape Lifestyle     Apps for on the Go Fitness  [place] 7 Minute Workout (orange box with white 7) - free on the go HIIT training juliana  Peloton Juliana @ www.onepeloton.com     Nutrition Trackers, Meal Preparation, and Other Meal Programs  LoseIT! And My Fitness Pal apps and on line for tracking nutrition  NOOM - virtual health coaching  FitFoundation (healthy meals on the go) in [place] @ wwwSNOBSWAPotmsetvcxfjhl9bZympi  Kenia CACERES @ NavmiibistrRestored Hearing Ltd.dZympi and Siggei12 (calorie smart and low carb plans recommended) @ www.rnutvb78.com, Metabolic Meals @ www.TakWakMetabolicMeals.com - individual prepared meals to go  Gobble, Blue Apron, Home , Every Plate, Sunbasket- on line meal delivery programs for preparation at home  Meal Village in [place] for homemade meals to go @ www.mealvillage.com  Diet Doctor @ www.dietdoctor.com - low carb swaps  ReciMe and Mealime juliana (grocery and meal planning)     Stress, Anxiety, Depression, Trauma  CALM meditation juliana (www.calm.com)  Headspace  Don't let anxiety run your life. Using the science of emotion regulation and mindfulness to overcome fear and worry by Rosendo Mccollum PsyD and Nilesh Fleming MA.  The Belinda Serrano Podcast (September 27, 2023): 6 Magic Words That Stop Anxiety  What Happened to You?- a  look at the impact trauma has on behavior written by Lis Long and Dr. Roberto Jeter  Whole Again by Ron Marte - discovering your true self after trauma     Mindful Eating/The Hungry Brain  Am I Hungry? Mindful eating virtual  rachel (www.amihungry.com)  The Hungry Brain by Sharon Brand, PhD  Mindless Eating by Aron Vail  Weight Loss Surgery Will Not Treat Food Addiction by Carina Arriaga Ph.D     Metabolic Dysfunction, Hormones and Cravings  Why We Get Sick? By Gerardo Hardy (insulin resistance)  Your Body in Balance: The New Science of Food, Hormones, and Health by Dr. Stevan Andrews  The Complete Guide to fasting by Dr. Lujan  Fast Like a Girl by Dr. Breann Payne  The M Factor (documentary on PBS about Menopause)  Sugar, Salt & Fat by Lara Mason, Ph.D, R.D.  The Truth About Sugar - documentary on sugar (Free on hotelsmap.com, https://youGeekChicDailyu.be/2S6kdupQI7y)  Presentation on SUGAR called Sugar: The Bitter Truth by Dr. Toan Live (hotelsmap.com) https://JenaValve Technologyu.be/dBnniua6-oM?si=[email or web address]  Reverse Visceral Fat: #1 Way to Increase Your Lifespan & End Inflammation with Dr. Eliazar Norwood on Utube @ https://JenaValve Technologyu.be/nupPRnvUpJY?si=ok9aczUwQBV3GwoS     Nutrition Support  You Are What You Eat - Netfix series on twin study looking at impact of nutrition changes on health  The End of Dieting: How to Live for Life by Dr. Jovani Boyd M.D. or listen to The BuzzMob Podcast Episode 63: Understanding \"Nutritarian\" Eating w/Dr. Jovani Boyd  The Game Changers- Specialty Soybean Farmsix Documentary on plant based nutrition  The Dr. Torres T5 Wellness Plan by Dr. Natanael Torres MD  The Complete Guide to fasting by Dr. Lujan  @[email or web address] (InstKindred Hospital Limaam Dietician with support surrounding nutrition and meal prep/planning)     Education, Motivation and Support Resources  Live to 100: Secrets of the Blue Zones - Netflix series on the secrets to communities living over 100 years old  Atomic Habits by Ken Fernando (a book about taking small steps  to promote greater behavior change)   Motivation rachel (black box with white \")- daily supportive messages sent to your phone  Can't Hurt Me by Rosendo Richard (a book exploring the power of discipline in achieving your goals)  Fed Up - documentary about obesity (Free on Utube)  Www.yourweightmatters.org - Obesity Action Coalition sponsored Blog posts  Obesity Action Coalition Resources on topics specific to weight management (www.obesityaction.org)  Fitlosophy Fitspiration - journal to better health (journal book found at [employer] in fitness aisle)  Alin Cabrera talk titled: The Call to Courage (Netflix)  The Exam Room by the Physician's Committee (Podcast)  Nutrition Facts by Dr. Schwartz (Podcast)

## 2025-08-11 DIAGNOSIS — E66.3 OVERWEIGHT (BMI 25.0-29.9): ICD-10-CM

## 2025-08-11 RX ORDER — METFORMIN HYDROCHLORIDE 500 MG/1
500 TABLET, EXTENDED RELEASE ORAL 2 TIMES DAILY WITH MEALS
Qty: 60 TABLET | Refills: 3 | Status: SHIPPED | OUTPATIENT
Start: 2025-08-11

## (undated) DEVICE — NEEDLE SPINAL 22X3-1/2 BLK

## (undated) DEVICE — WOLF INFLOW HYSTER

## (undated) DEVICE — DEV REMOVAL TRUCLEAR SFT MINI

## (undated) DEVICE — GLOVE SURG TRIUMPH SZ 6

## (undated) DEVICE — GYN CDS: Brand: MEDLINE INDUSTRIES, INC.

## (undated) DEVICE — SOL  .9 3000ML

## (undated) DEVICE — KENDALL SCD EXPRESS SLEEVES, KNEE LENGTH, MEDIUM: Brand: KENDALL SCD

## (undated) NOTE — IP AVS SNAPSHOT
[facility] [place] [address] [place], [address] ~ [phone]                Discharge Summary   3/9/2017    [address]           Admission Information        Provider Department    3/9/2017 Jhonny Cardoza MD  Pre-Op / As ?? Bleeding greater than 1 perineal pad per hour  ? ?  A temperature greater than 101  You received a drug called Toradol which is an Anti Inflammatory at 2pm  If you are allowed to take Anti inflammatories  Do not take any Anti Inflammatory like Motrin, Nancy harming yourself, contact [facility] at [phone]. - If you don’t have insurance, Juan Hutchins has partnered with Patient [address] to help you get signed up for insurance coverage.   Patient [place]

## (undated) NOTE — LETTER
12/30/20        Beto Harvey  [address] [address]      Dear Ankita Douglas,    [facility] records indicate that you have outstanding lab work and or testing that was ordered for you and has not yet been completed:  Orders Placed This Encounter      M

## (undated) NOTE — LETTER
Date: 6/4/2024    Patient Name: Lorenza Alfaro          To Whom it may concern:    This letter has been written at the patient's request. The above patient was seen at [facility] for treatment of a medical condition.    This patient should be allowed to work from home 6/5/24-6/7/24.        Sincerely,    NIDA Diaz